# Patient Record
Sex: FEMALE | Race: WHITE | Employment: OTHER | URBAN - NONMETROPOLITAN AREA
[De-identification: names, ages, dates, MRNs, and addresses within clinical notes are randomized per-mention and may not be internally consistent; named-entity substitution may affect disease eponyms.]

---

## 2017-02-07 ENCOUNTER — OFFICE VISIT (OUTPATIENT)
Dept: CARDIOLOGY CLINIC | Age: 82
End: 2017-02-07

## 2017-02-07 VITALS
SYSTOLIC BLOOD PRESSURE: 128 MMHG | DIASTOLIC BLOOD PRESSURE: 74 MMHG | HEART RATE: 68 BPM | HEIGHT: 67 IN | BODY MASS INDEX: 30.29 KG/M2 | RESPIRATION RATE: 14 BRPM | WEIGHT: 193 LBS

## 2017-02-07 DIAGNOSIS — E78.2 MIXED HYPERLIPIDEMIA: ICD-10-CM

## 2017-02-07 DIAGNOSIS — I49.3 VENTRICULAR PREMATURE DEPOLARIZATION: ICD-10-CM

## 2017-02-07 DIAGNOSIS — R00.2 PALPITATIONS: ICD-10-CM

## 2017-02-07 DIAGNOSIS — R07.9 CHEST PAIN, UNSPECIFIED TYPE: Primary | ICD-10-CM

## 2017-02-07 PROCEDURE — G8419 CALC BMI OUT NRM PARAM NOF/U: HCPCS | Performed by: INTERNAL MEDICINE

## 2017-02-07 PROCEDURE — 99214 OFFICE O/P EST MOD 30 MIN: CPT | Performed by: INTERNAL MEDICINE

## 2017-02-07 PROCEDURE — G8400 PT W/DXA NO RESULTS DOC: HCPCS | Performed by: INTERNAL MEDICINE

## 2017-02-07 PROCEDURE — 93000 ELECTROCARDIOGRAM COMPLETE: CPT | Performed by: INTERNAL MEDICINE

## 2017-02-07 PROCEDURE — 1090F PRES/ABSN URINE INCON ASSESS: CPT | Performed by: INTERNAL MEDICINE

## 2017-02-07 PROCEDURE — 4040F PNEUMOC VAC/ADMIN/RCVD: CPT | Performed by: INTERNAL MEDICINE

## 2017-02-07 PROCEDURE — G8484 FLU IMMUNIZE NO ADMIN: HCPCS | Performed by: INTERNAL MEDICINE

## 2017-02-07 PROCEDURE — 1036F TOBACCO NON-USER: CPT | Performed by: INTERNAL MEDICINE

## 2017-02-07 PROCEDURE — G8427 DOCREV CUR MEDS BY ELIG CLIN: HCPCS | Performed by: INTERNAL MEDICINE

## 2017-02-07 PROCEDURE — 1123F ACP DISCUSS/DSCN MKR DOCD: CPT | Performed by: INTERNAL MEDICINE

## 2017-02-07 RX ORDER — OXYBUTYNIN CHLORIDE 5 MG/1
5 TABLET ORAL DAILY
COMMUNITY
End: 2019-05-04

## 2017-02-07 RX ORDER — ZINC OXIDE 13 %
CREAM (GRAM) TOPICAL
COMMUNITY

## 2017-02-07 RX ORDER — ALBUTEROL SULFATE 90 UG/1
2 AEROSOL, METERED RESPIRATORY (INHALATION) PRN
COMMUNITY

## 2017-02-07 RX ORDER — ACETAMINOPHEN 500 MG
1000 TABLET ORAL PRN
COMMUNITY

## 2017-02-07 RX ORDER — BENZONATATE 100 MG/1
100 CAPSULE ORAL PRN
COMMUNITY

## 2017-02-14 ENCOUNTER — PROCEDURE VISIT (OUTPATIENT)
Dept: CARDIOLOGY CLINIC | Age: 82
End: 2017-02-14

## 2017-02-14 DIAGNOSIS — R07.9 CHEST PAIN, UNSPECIFIED TYPE: ICD-10-CM

## 2017-02-14 PROCEDURE — 78452 HT MUSCLE IMAGE SPECT MULT: CPT | Performed by: INTERNAL MEDICINE

## 2017-02-14 PROCEDURE — 93015 CV STRESS TEST SUPVJ I&R: CPT | Performed by: INTERNAL MEDICINE

## 2017-02-14 PROCEDURE — A9500 TC99M SESTAMIBI: HCPCS | Performed by: INTERNAL MEDICINE

## 2017-02-16 ENCOUNTER — TELEPHONE (OUTPATIENT)
Dept: CARDIOLOGY CLINIC | Age: 82
End: 2017-02-16

## 2017-03-20 ENCOUNTER — HOSPITAL ENCOUNTER (OUTPATIENT)
Dept: PHYSICAL THERAPY | Age: 82
Discharge: HOME | End: 2017-03-31
Attending: PHYSICAL MEDICINE & REHABILITATION | Admitting: PHYSICAL MEDICINE & REHABILITATION

## 2017-04-01 ENCOUNTER — HOSPITAL ENCOUNTER (OUTPATIENT)
Dept: PHYSICAL THERAPY | Age: 82
Discharge: HOME | End: 2017-04-30
Attending: PHYSICAL MEDICINE & REHABILITATION | Admitting: PHYSICAL MEDICINE & REHABILITATION

## 2017-05-01 ENCOUNTER — HOSPITAL ENCOUNTER (OUTPATIENT)
Dept: PHYSICAL THERAPY | Age: 82
Discharge: HOME | End: 2017-05-31
Attending: PHYSICAL MEDICINE & REHABILITATION | Admitting: PHYSICAL MEDICINE & REHABILITATION

## 2017-05-15 ENCOUNTER — HOSPITAL ENCOUNTER (OUTPATIENT)
Dept: OTHER | Age: 82
Discharge: HOME | End: 2017-05-15
Attending: SPECIALIST | Admitting: SPECIALIST

## 2017-05-17 LAB
CULTURE: NORMAL
REPORT STATUS: NORMAL
REQUEST PROBLEM: NORMAL
SPECIMEN: NORMAL

## 2017-06-01 ENCOUNTER — HOSPITAL ENCOUNTER (OUTPATIENT)
Dept: PHYSICAL THERAPY | Age: 82
Discharge: HOME | End: 2017-06-30
Attending: PHYSICAL MEDICINE & REHABILITATION | Admitting: PHYSICAL MEDICINE & REHABILITATION

## 2017-07-01 ENCOUNTER — HOSPITAL ENCOUNTER (OUTPATIENT)
Dept: PHYSICAL THERAPY | Age: 82
Discharge: HOME | End: 2017-07-24
Attending: PHYSICAL MEDICINE & REHABILITATION | Admitting: PHYSICAL MEDICINE & REHABILITATION

## 2017-08-04 ENCOUNTER — HOSPITAL ENCOUNTER (OUTPATIENT)
Dept: PHYSICAL THERAPY | Age: 82
Discharge: HOME | End: 2017-08-31
Attending: PHYSICAL MEDICINE & REHABILITATION | Admitting: PHYSICAL MEDICINE & REHABILITATION

## 2017-08-04 ASSESSMENT — PAIN SCALES - GENERAL: PAINLEVEL_OUTOF10: 0

## 2017-08-04 ASSESSMENT — PAIN DESCRIPTION - PAIN TYPE: TYPE: CHRONIC PAIN

## 2017-08-04 ASSESSMENT — PAIN DESCRIPTION - PROGRESSION: CLINICAL_PROGRESSION: NOT CHANGED

## 2017-08-04 ASSESSMENT — PAIN DESCRIPTION - FREQUENCY: FREQUENCY: INTERMITTENT

## 2017-08-04 ASSESSMENT — PAIN DESCRIPTION - LOCATION: LOCATION: BACK

## 2017-08-04 ASSESSMENT — PAIN DESCRIPTION - DESCRIPTORS: DESCRIPTORS: ACHING

## 2017-08-18 ENCOUNTER — HOSPITAL ENCOUNTER (OUTPATIENT)
Dept: PHYSICAL THERAPY | Age: 82
Discharge: HOME | End: 2017-08-18
Admitting: PHYSICAL MEDICINE & REHABILITATION

## 2017-09-01 ENCOUNTER — HOSPITAL ENCOUNTER (OUTPATIENT)
Dept: PHYSICAL THERAPY | Age: 82
Discharge: HOME | End: 2017-09-27
Attending: PHYSICAL MEDICINE & REHABILITATION | Admitting: PHYSICAL MEDICINE & REHABILITATION

## 2018-02-27 ENCOUNTER — OFFICE VISIT (OUTPATIENT)
Dept: CARDIOLOGY CLINIC | Age: 83
End: 2018-02-27

## 2018-02-27 VITALS
HEART RATE: 76 BPM | DIASTOLIC BLOOD PRESSURE: 70 MMHG | BODY MASS INDEX: 26.84 KG/M2 | WEIGHT: 171 LBS | RESPIRATION RATE: 16 BRPM | HEIGHT: 67 IN | SYSTOLIC BLOOD PRESSURE: 114 MMHG

## 2018-02-27 DIAGNOSIS — R00.2 PALPITATIONS: Primary | ICD-10-CM

## 2018-02-27 DIAGNOSIS — E78.2 MIXED HYPERLIPIDEMIA: ICD-10-CM

## 2018-02-27 DIAGNOSIS — Z82.49 FAMILY HISTORY OF EARLY CAD: ICD-10-CM

## 2018-02-27 DIAGNOSIS — R07.9 CHEST PAIN, UNSPECIFIED TYPE: ICD-10-CM

## 2018-02-27 PROCEDURE — G8400 PT W/DXA NO RESULTS DOC: HCPCS | Performed by: INTERNAL MEDICINE

## 2018-02-27 PROCEDURE — 1036F TOBACCO NON-USER: CPT | Performed by: INTERNAL MEDICINE

## 2018-02-27 PROCEDURE — G8484 FLU IMMUNIZE NO ADMIN: HCPCS | Performed by: INTERNAL MEDICINE

## 2018-02-27 PROCEDURE — 4040F PNEUMOC VAC/ADMIN/RCVD: CPT | Performed by: INTERNAL MEDICINE

## 2018-02-27 PROCEDURE — 99214 OFFICE O/P EST MOD 30 MIN: CPT | Performed by: INTERNAL MEDICINE

## 2018-02-27 PROCEDURE — G8417 CALC BMI ABV UP PARAM F/U: HCPCS | Performed by: INTERNAL MEDICINE

## 2018-02-27 PROCEDURE — 1123F ACP DISCUSS/DSCN MKR DOCD: CPT | Performed by: INTERNAL MEDICINE

## 2018-02-27 PROCEDURE — 1090F PRES/ABSN URINE INCON ASSESS: CPT | Performed by: INTERNAL MEDICINE

## 2018-02-27 PROCEDURE — G8427 DOCREV CUR MEDS BY ELIG CLIN: HCPCS | Performed by: INTERNAL MEDICINE

## 2018-02-27 PROCEDURE — 93000 ELECTROCARDIOGRAM COMPLETE: CPT | Performed by: INTERNAL MEDICINE

## 2018-02-27 RX ORDER — HYOSCYAMINE SULFATE 0.125 MG
125 TABLET ORAL 4 TIMES DAILY PRN
COMMUNITY
End: 2019-09-09

## 2018-02-27 RX ORDER — UBIDECARENONE 50 MG
CAPSULE ORAL
COMMUNITY

## 2018-02-27 RX ORDER — MULTIVITAMIN WITH IRON
250 TABLET ORAL DAILY
COMMUNITY
End: 2019-05-04

## 2018-02-27 RX ORDER — TRAZODONE HYDROCHLORIDE 100 MG/1
100 TABLET ORAL NIGHTLY
COMMUNITY

## 2018-02-27 NOTE — PATIENT INSTRUCTIONS
Continue current cardiovascular medications which have been reviewed and discussed individually with you. Primary prevention is the goal by aggressive risk modification, healthy and therapeutic life style changes for cardiovascular risk reduction. Various goals are discussed and questions answered. Appropriate prescriptions if needed on this visit are addressed. After visit summery is provided. Questions answered and patient verbalizes understanding. Follow up with PCP and see me as needed.

## 2018-04-25 ENCOUNTER — HOSPITAL ENCOUNTER (OUTPATIENT)
Dept: MAMMOGRAPHY | Age: 83
Discharge: HOME | End: 2018-04-25
Admitting: FAMILY MEDICINE

## 2018-04-25 DIAGNOSIS — Z78.0 ASYMPTOMATIC MENOPAUSAL STATE: ICD-10-CM

## 2018-04-25 DIAGNOSIS — Z78.0 ASYMPTOMATIC AGE-RELATED POSTMENOPAUSAL STATE: ICD-10-CM

## 2018-09-19 ENCOUNTER — HOSPITAL ENCOUNTER (OUTPATIENT)
Dept: PHYSICAL THERAPY | Age: 83
Discharge: HOME | End: 2018-09-19

## 2018-10-09 NOTE — PROGRESS NOTES
Physical Therapy  Cancellation/No-show Note  Patient Name:  Sylvia Howard  :  1935   Date:  10/9/2018  Cancelled visits to date: 1  Cancelled PT evaluation  No-shows to date: 0    For today's appointment patient:  [x]  Cancelled  []  Rescheduled appointment  []  No-show     Reason given by patient:  []  Patient ill  []  Conflicting appointment  []  No transportation    []  Conflict with work  []  No reason given  []  Other:     Comments:      Electronically signed by:  Erica Laughlin PT, 10/9/2018, 1:54 PM

## 2018-10-17 ENCOUNTER — HOSPITAL ENCOUNTER (OUTPATIENT)
Dept: PHYSICAL THERAPY | Age: 83
Setting detail: THERAPIES SERIES
Discharge: HOME | End: 2018-10-17
Payer: MEDICARE

## 2018-10-17 PROCEDURE — 97530 THERAPEUTIC ACTIVITIES: CPT

## 2018-10-17 PROCEDURE — G8978 MOBILITY CURRENT STATUS: HCPCS

## 2018-10-17 PROCEDURE — G8981 BODY POS CURRENT STATUS: HCPCS

## 2018-10-17 PROCEDURE — 97110 THERAPEUTIC EXERCISES: CPT

## 2018-10-17 PROCEDURE — G8982 BODY POS GOAL STATUS: HCPCS

## 2018-10-17 PROCEDURE — G8979 MOBILITY GOAL STATUS: HCPCS

## 2018-10-17 PROCEDURE — 97162 PT EVAL MOD COMPLEX 30 MIN: CPT

## 2018-10-17 ASSESSMENT — PAIN DESCRIPTION - ONSET: ONSET: PROGRESSIVE

## 2018-10-17 ASSESSMENT — PAIN DESCRIPTION - FREQUENCY: FREQUENCY: INTERMITTENT

## 2018-10-17 ASSESSMENT — PAIN SCALES - GENERAL: PAINLEVEL_OUTOF10: 1

## 2018-10-17 ASSESSMENT — PAIN DESCRIPTION - LOCATION: LOCATION: BACK;HIP

## 2018-10-17 ASSESSMENT — PAIN DESCRIPTION - DESCRIPTORS: DESCRIPTORS: ACHING

## 2018-10-17 ASSESSMENT — PAIN DESCRIPTION - ORIENTATION: ORIENTATION: RIGHT;LEFT;MID

## 2018-10-17 ASSESSMENT — PAIN DESCRIPTION - PAIN TYPE: TYPE: CHRONIC PAIN

## 2018-10-17 ASSESSMENT — PAIN DESCRIPTION - DIRECTION: RADIATING_TOWARDS: RARELY

## 2018-10-17 ASSESSMENT — PAIN DESCRIPTION - PROGRESSION: CLINICAL_PROGRESSION: GRADUALLY WORSENING

## 2018-10-17 NOTE — FLOWSHEET NOTE
pathology and how their condition effects them with their functional activities. Patient understood discussion and questions were answered. Patient understands their activity limitations and understands rational for treatment progression. Home Exercise Program:  HO issued, reviewed and discussed with patient. Pt agreed to comply. Modality/intervention planned:    [x] Therapeutic Exercise  [] Modalities:  [] Therapeutic Activity     [] Ultrasound  [] Elec  Stim  [] Gait Training      [] Cervical Traction [] Lumbar Traction  [] Neuromuscular Re-education    [] Cold/hotpack [] Iontophoresis   [x] Instruction in HEP      [] Vasopneumatic     [] Manual Therapy               [] Aquatic Therapy     Manual Treatments:  --    Modalities:  Declined    Communication with other providers:  POC sent 10/17/18    Education provided to patient/caregiver: ice/heat as needed. Adverse reactions to treatment: --     Equipment provided:  GTB    Assessment:    Pt is 81year old female with B hip trochanteric bursitis with chronic LBP. Pt now has difficulties completing prolonged walking, lifting, bending and sleeping. Pt demo deficits this date that include Hip strength, balance, weightbearing tolerance and flexibility restrictions contributing to pain and reduced tolerance. Testing this date indicate signs and symptoms of B trochanteric bursitis with lumbar stenosis. Pt will benefit with PT services with progression of strength/ROM, manual, modalities and gait activiites to maximize function. Pt prior to onset of current condition had min lateral hip pain with able to complete most mobility activities with min difficulties. Patient agrees with established plan of care and assisted in the development of their short term and long term goals. Patient had no adverse reaction with initial treatment and there are no barriers to learning. Demonstrates no mental or cognitive disorder.      Time In / Time Out:   7537/5987 Timed Code/Total Treatment Minutes:  25/65'      15; TE, 10' TA, 1 PT eval     Patients Report of Tolerance:    [] Patient limited by fatigue        [] Patient limited by pain   [] Patient limited by other medical complications   [] Other:     Prognosis:   [x] Good [x] Fair  [] Poor    Plan:   [] Continue per plan of care [] Alter current plan (see comments)  [x] Plan of care initiated [] Hold pending MD visit [] Discharge    Plan for Next Session:   Review HEP, open and closed chain exercises targeting hip ABD/ER and Ext to improve LE strength/tolerance. Standing balance activities as tolerated. Possible pool activities if no symptom improvement after few weeks.         Next Progress Note due:    Eval 10/17/18   Visit 10         Electronically signed by:  Darylene Hoes, PT, DPT, OCS  10/17/2018, 3:58 PM        10/17/2018 3:58 PM

## 2018-10-19 ENCOUNTER — HOSPITAL ENCOUNTER (OUTPATIENT)
Dept: PHYSICAL THERAPY | Age: 83
Setting detail: THERAPIES SERIES
Discharge: HOME | End: 2018-10-19
Payer: MEDICARE

## 2018-10-19 PROCEDURE — 97110 THERAPEUTIC EXERCISES: CPT

## 2018-10-19 PROCEDURE — 97530 THERAPEUTIC ACTIVITIES: CPT

## 2018-10-22 ENCOUNTER — HOSPITAL ENCOUNTER (OUTPATIENT)
Dept: PHYSICAL THERAPY | Age: 83
Discharge: HOME | End: 2018-10-22

## 2018-10-22 NOTE — FLOWSHEET NOTE
Physical Therapy  Cancellation/No-show Note  Patient Name:  Ovidio Olmstead  :  1935   Date:  10/22/2018  Cancelled visits to date: 1  No-shows to date: 0    For today's appointment patient:  [x]  Cancelled  []  Rescheduled appointment  []  No-show     Reason given by patient:  []  Patient ill  []  Conflicting appointment  []  No transportation    []  Conflict with work  []  No reason given  [x]  Other:     Comments:  Patient states that she has a new order for PT on her back. Did not want to come to therapy until she can be evaluated for her back since we are currently not treating her for it.      Electronically signed by:  Jyoti Monk PTA

## 2018-10-24 ENCOUNTER — HOSPITAL ENCOUNTER (OUTPATIENT)
Dept: MAMMOGRAPHY | Age: 83
Discharge: HOME | End: 2018-10-24
Payer: MEDICARE

## 2018-10-24 DIAGNOSIS — Z12.31 VISIT FOR SCREENING MAMMOGRAM: ICD-10-CM

## 2018-10-24 DIAGNOSIS — Z78.0 MENOPAUSE: ICD-10-CM

## 2018-10-24 PROCEDURE — 77067 SCR MAMMO BI INCL CAD: CPT

## 2018-10-29 ENCOUNTER — HOSPITAL ENCOUNTER (OUTPATIENT)
Dept: PHYSICAL THERAPY | Age: 83
Setting detail: THERAPIES SERIES
Discharge: HOME | End: 2018-10-29
Payer: MEDICARE

## 2018-10-29 PROCEDURE — 97530 THERAPEUTIC ACTIVITIES: CPT

## 2018-10-29 PROCEDURE — 97110 THERAPEUTIC EXERCISES: CPT

## 2018-10-29 NOTE — FLOWSHEET NOTE
Poor    Plan:   [x] Continue per plan of care [] Alter current plan (see comments)  [] Plan of care initiated [] Hold pending MD visit [] Discharge    Plan for Next Session:   Review HEP, open and closed chain exercises targeting hip ABD/ER and Ext to improve LE strength/tolerance. Standing balance activities as tolerated. Possible pool activities if no symptom improvement after few weeks.         Next Progress Note due:    Eval 10/17/18   Visit 10         Electronically signed by:  Bushra Schmitt II, PTA   10/29/2018, 1:03 PM        10/29/2018 1:03 PM

## 2019-04-22 ENCOUNTER — HOSPITAL ENCOUNTER (OUTPATIENT)
Dept: CARDIAC REHAB | Age: 84
Discharge: HOME | End: 2019-04-22
Payer: MEDICARE

## 2019-04-22 PROCEDURE — 93225 XTRNL ECG REC<48 HRS REC: CPT

## 2019-04-22 PROCEDURE — 93226 XTRNL ECG REC<48 HR SCAN A/R: CPT

## 2019-04-30 NOTE — PROCEDURES
[facility]                  [address] [facility] [address] [facility]                                 HOLTER MONITOR    PATIENT NAME: Alisa Mosley                 :        1935  MED REC NO:   [id number]                          ROOM:  ACCOUNT NO:   [de-identified]                           ADMIT DATE: 2019  PROVIDER:     Ambrocio Lopez MD    HOLTER MONITOR 48 HOURS    DATE OF STUDY:  2019    PATIENT OF:  JOVANI Brown    INDICATION:  To evaluate palpitations. INTERPRETATION:  Technically good quality tracing suggestive of  predominantly normal sinus rhythm at average rate of 69 beats per  minute. Heart rate varied between 57 and 109 beats per minute. Lowest  rate of 57 occurred at 08:15 a.m. The fastest rate of 109 beats per  minute occurred at 08:12 p.m. Rare isolated 27 PVCs and 67 PACs are  noted without any complex arrhythmias. The patient reported symptoms of  weakness and fear of fainting while walking down hospital corridor at  11:20 a.m. and reported palpitations at 07:14 p.m., 06:10 p.m., 07:10  p.m., 10:00 p.m. during normal rate and rhythm. No significant ectopy  is noted. CONCLUSION:  Normal study suggesting normal sinus rhythm with normal  heart rate variations and rare within normal limits ectopy. Symptoms  are reported during normal rate and rhythm.         Anselmo Alonso MD    D: 2019 13:25:25       T: 2019 19:48:07     [email or web address]  Job#: [id number]     Doc#: [id number]    CC:

## 2019-05-04 ENCOUNTER — APPOINTMENT (OUTPATIENT)
Dept: CT IMAGING | Age: 84
End: 2019-05-04
Payer: MEDICARE

## 2019-05-04 ENCOUNTER — HOSPITAL ENCOUNTER (EMERGENCY)
Age: 84
Discharge: HOME | End: 2019-05-04
Attending: EMERGENCY MEDICINE
Payer: MEDICARE

## 2019-05-04 VITALS
SYSTOLIC BLOOD PRESSURE: 126 MMHG | BODY MASS INDEX: 26.68 KG/M2 | TEMPERATURE: 97.8 F | HEIGHT: 67 IN | WEIGHT: 170 LBS | DIASTOLIC BLOOD PRESSURE: 73 MMHG | RESPIRATION RATE: 16 BRPM | OXYGEN SATURATION: 94 % | HEART RATE: 59 BPM

## 2019-05-04 DIAGNOSIS — S00.83XA CONTUSION OF FACE, INITIAL ENCOUNTER: ICD-10-CM
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
## 2019-05-04 DIAGNOSIS — S09.90XA CLOSED HEAD INJURY, INITIAL ENCOUNTER: ICD-10-CM

## 2019-05-04 PROCEDURE — 70486 CT MAXILLOFACIAL W/O DYE: CPT

## 2019-05-04 PROCEDURE — 99284 EMERGENCY DEPT VISIT MOD MDM: CPT

## 2019-05-04 PROCEDURE — 72125 CT NECK SPINE W/O DYE: CPT

## 2019-05-04 PROCEDURE — 70450 CT HEAD/BRAIN W/O DYE: CPT

## 2019-05-04 ASSESSMENT — PAIN DESCRIPTION - PAIN TYPE: TYPE: ACUTE PAIN

## 2019-05-04 ASSESSMENT — PAIN SCALES - GENERAL: PAINLEVEL_OUTOF10: 0

## 2019-05-04 ASSESSMENT — PAIN DESCRIPTION - LOCATION: LOCATION: HEAD

## 2019-05-04 ASSESSMENT — PAIN DESCRIPTION - ORIENTATION: ORIENTATION: RIGHT

## 2019-05-04 NOTE — ED PROVIDER NOTES
eMERGENCY dEPARTMENT eNCOUnter      CHIEF COMPLAINT:   Fall  Head and face pain    HPI: Susana Kenny is a 81 y.o. female who presents to the Emergency Department complaining of a pain to her right temple and right jaw after she lost her balance and fell yesterday. The patient states that she tripped and fell forward, striking the right frontal area of her head and face. She did not lose consciousness. She was able to get up on her own. She did not think she was hurt initially, however she has developed a throbbing pain in her right temple and right jaw. The pain is constant. It is worse if she touches the area and if she opens her mouth widely. It is better when she does not do this. she does not take blood thinners. The patient denies blurred vision, slurred speech, neck pain, neck stiffness, vomiting, numbness, tingling, or weakness. REVIEW OF SYSTEMS:  CONSTITUTIONAL:  Denies fever, chills, weight loss or weakness  EYES:  Denies photophobia or discharge  ENT:  Denies sore throat or ear pain  CARDIOVASCULAR:  Denies chest pain, palpitations or swelling  RESPIRATORY:  Denies cough or shortness of breath  GI:  Denies abdominal pain, nausea, vomiting, or diarrhea  MUSCULOSKELETAL:  Denies back pain  SKIN:  No rash  NEUROLOGIC:  Complains of headache, denies focal weakness or sensory changes  All systems negative except as marked. \"Remaining review of systems reviewed and negative. I have reviewed the nursing triage documentation and agree unless otherwise noted below. \"      PAST MEDICAL HISTORY:   Past Medical History:   Diagnosis Date    Chest pain     Cystitis     Family history of early CAD 2018    She reports mother  at age of 64 with heart attack. She was not a smoker.  H/O cardiovascular stress test 2017    Normal pattern of perfusion in all coronaries, normal LV size and function, EF 54%.     H/O complete electrocardiogram 10/14/2011    H/O Doppler ultrasound 2009 Carotid Doppler. Minimal atherosclerotic plaque in the carotid arterial bifurcation regions bilaterally. No sonographic evidence of hemodynamically significant carotid stenosis is noted. Antegrade flow is seen in the vertebral arteries bilaterally.  H/O echocardiogram 2/3/15    Normal left ventricular size and wall motion with normal systolic and abnorml diastolic function, mild TR, MR and AR, aortic sclerosis without stenosis, EF 55-60%.  H/O tilt table evaluation 4/4/14    Positive for orthostatic hypotension,negative for neurocardiogenic syncope    History of Holter monitoring 12/4/14    24 hour Holter. Sinus rhythm w/physiological HR variations and low-grade symptomatic ventricular ectopy.  Hyperlipidemia     IBS (irritable bowel syndrome)     Migraine     Obesity     Other activity(E029.9) 03/23/2010    48 Holter Monitor. Normal holter findingd with no clinically significant arrhythmias.  Palpitations     SOBOE (shortness of breath on exertion)     Stroke (HCC)     several TIA's    Syncopal episodes     Tachycardia     av modal re-entry       CURRENT MEDICATIONS:   Home medications reviewed. SURGICAL HISTORY:   Past Surgical History:   Procedure Laterality Date    ATRIAL ABLATION SURGERY  1999    BREAST BIOPSY      bilateral    CATARACT REMOVAL      bilateral    HYSTERECTOMY      repair of prolapse    JOINT REPLACEMENT      bilateral knee, bilateral hip    ROTATOR CUFF REPAIR      right       FAMILY HISTORY:   History reviewed. No pertinent family history.     SOCIAL HISTORY:   Social History     Socioeconomic History    Marital status:      Spouse name: Not on file    Number of children: Not on file    Years of education: Not on file    Highest education level: Not on file   Occupational History    Not on file   Social Needs    Financial resource strain: Not on file    Food insecurity:     Worry: Not on file     Inability: Not on file    Transportation needs: Medical: Not on file     Non-medical: Not on file   Tobacco Use    Smoking status: Never Smoker    Smokeless tobacco: Never Used   Substance and Sexual Activity    Alcohol use: No    Drug use: No    Sexual activity: Yes     Partners: Male     Comment:    Lifestyle    Physical activity:     Days per week: Not on file     Minutes per session: Not on file    Stress: Not on file   Relationships    Social connections:     Talks on phone: Not on file     Gets together: Not on file     Attends Jewish service: Not on file     Active member of club or organization: Not on file     Attends meetings of clubs or organizations: Not on file     Relationship status: Not on file    Intimate partner violence:     Fear of current or ex partner: Not on file     Emotionally abused: Not on file     Physically abused: Not on file     Forced sexual activity: Not on file   Other Topics Concern    Not on file   Social History Narrative    Not on file       ALLERGIES: Aspirin; Mom [magnesium hydroxide]; Oxycodone; Pcn [penicillins]; and Thorazine [chlorpromazine hcl]    PHYSICAL EXAM:  VITAL SIGNS:  ED Triage Vitals [05/04/19 1242]   Enc Vitals Group      /73      Pulse 59      Resp 16      Temp 97.8 °F (36.6 °C)      Temp Source Oral      SpO2 94 %      Weight 170 lb (77.1 kg)      Height 5' 7\" (1.702 m)      Head Circumference       Peak Flow       Pain Score       Pain Loc       Pain Edu? Excl. in [address]?        Constitutional:  Non-toxic appearance, Awake, Alert  HENT: Normocephalic, Atraumatic, tender to palpation of the right temple, no bruising, no swelling Bilateral external ears normal, Oropharynx moist, No oral exudates, Nose normal, tender to palpation over the area of the right TMJ, no swelling, no bruising  Eyes: PERRL, conjunctiva normal   Neck: Normal range of motion, No tenderness, Supple, No lymphadenopathy, No stridor, No meningeal signs  Cardiovascular:  Normal heart rate, Normal rhythm  Pulmonary/Chest:  Normal breath sounds, No respiratory distress, No wheezing  Abdomen: Bowel sounds normal, Soft, No tenderness, No masses, No pulsatile masses  Back:  No tenderness, No CVA tenderness  Extremities:  Normal range of motion, Intact distal pulses, No edema, No tenderness  Neurologic:  Alert & oriented x 3, Speech clear, CN 2-12 intact, Normal motor function, Sensation intact to light touch throughout, Normal deep tendon reflexes, No focal deficits  Skin:  Warm, Dry, No erythema, No rash      EKG:    None    Radiology / Procedures:  CT Cervical Spine WO Contrast (Final result)   Result time 05/04/19 14:02:00   Final result by Osmany Ascencio MD (05/04/19 14:02:00)                Impression:    No acute abnormality of the cervical spine. Narrative:    EXAMINATION:  CT OF THE CERVICAL SPINE WITHOUT CONTRAST 5/4/2019 1:27 pm    TECHNIQUE:  CT of the cervical spine was performed without the administration of  intravenous contrast. Multiplanar reformatted images are provided for review. Dose modulation, iterative reconstruction, and/or weight based adjustment of  the mA/kV was utilized to reduce the radiation dose to as low as reasonably  achievable. COMPARISON:  None. HISTORY:  ORDERING SYSTEM PROVIDED HISTORY: C-SPINE TRAUMA, NEXUS/CCR NEGATIVE, LOW RISK  TECHNOLOGIST PROVIDED HISTORY:  Additional signs and symptoms: fall yest. neck pain    FINDINGS:  BONES/ALIGNMENT: There is no evidence of an acute cervical spine fracture. There is normal alignment of the cervical spine. DEGENERATIVE CHANGES: No significant degenerative changes. SOFT TISSUES: There is no prevertebral soft tissue swelling.                    CT FACIAL BONES WO CONTRAST (Final result)   Result time 05/04/19 13:56:46   Final result by Osmany Ascencio MD (05/04/19 13:56:46)                Impression:    No acute traumatic injury of the facial bones.             Narrative:    EXAMINATION:  CT OF THE FACE WITHOUT CONTRAST  5/4/2019 1:27 pm    TECHNIQUE:  CT of the face was performed without the administration of intravenous  contrast. Multiplanar reformatted images are provided for review. Dose  modulation, iterative reconstruction, and/or weight based adjustment of the  mA/kV was utilized to reduce the radiation dose to as low as reasonably  achievable. COMPARISON:  None    HISTORY:  ORDERING SYSTEM PROVIDED HISTORY: Fall, facial trauma and pain  TECHNOLOGIST PROVIDED HISTORY:  Additional signs and symptoms: lt cheek abrasion, fall yest.    FINDINGS:  FACIAL BONES:  The maxilla, pterygoid plates and zygomatic arches are intact. The mandible is intact.  The mandibular condyles are normally situated.  The  nasal bones and maxillary nasal processes are intact. ORBITS:  The globes appear intact.  The extraocular muscles, optic nerve  sheath complexes and lacrimal glands appear unremarkable.  No retrobulbar  hematoma or mass is seen.  The orbital walls and rims are intact. SINUSES/MASTOIDS:  The paranasal sinuses and mastoid air cells are well  aerated.  No acute fracture is seen. SOFT TISSUES:  No appreciable facial soft tissue swelling is seen.                    CT Head WO Contrast (Final result)   Result time 05/04/19 13:55:49   Final result by Todd Black MD (05/04/19 13:55:49)                Impression:    No acute intracranial abnormality. Narrative:    EXAMINATION:  CT OF THE HEAD WITHOUT CONTRAST  5/4/2019 1:27 pm    TECHNIQUE:  CT of the head was performed without the administration of intravenous  contrast. Dose modulation, iterative reconstruction, and/or weight based  adjustment of the mA/kV was utilized to reduce the radiation dose to as low  as reasonably achievable.     COMPARISON:  Rubina 15, 2015    HISTORY:  ORDERING SYSTEM PROVIDED HISTORY: HEAD TRAUMA, CLOSED, MILD, GCS >= 13, NO  RISK FACTORS, NEURO EXAM NORMAL  TECHNOLOGIST PROVIDED HISTORY:  Has a \"code stroke\" or \"stroke alert\" been called? ->No  Additional signs and symptoms: forehead injury    FINDINGS:  BRAIN: Wayna González is mild age-appropriate atrophy seen throughout the brain  parenchyma.  There is periventricular white matter changes seen to be present  consistent with small vessel ischemic disease.  There is mild ex vacuo  dilatation of the ventricular system.  There is no intra-axial or extra-axial  bleed. Wayna González is no evidence for mass or mass effect.  The sinuses are clear  without disease.  There are no acute bony abnormality seen. ED COURSE & MEDICAL DECISION MAKING:  Pertinent Labs & Imaging studies reviewed. (See chart for details)  On exam, the patient is afebrile and nontoxic appearing. She is awake and alert. She is hemodynamically stable and neurologically intact. Neck is supple with no meningeal signs. CT head, cervical spine and facial bones are negative for acute abnormality. The patient declined needing anything for pain. I suspect that the patient had a mechanical fall and sustained a closed head injury and a facial contusion. I have a low suspicion for subarachnoid hemorrhage, meningitis, encephalitis,vasculitis, temporal arteritis, cerebrovascular accident, hypertensive emergency, pseudotumor cerebri, or mass effect. I feel that the patient is stable for outpatient management with follow up in 2-3 days. She is to return to the Emergency Department immediately for increased headache, confusion, neck stiffness, fevers, numbness, tingling, weakness, or for any other concerns. The patient verbalized understanding, was agreeable with plan, and the patient was discharged home in stable condition. Clinical Impression:  1. Fall, initial encounter    2. Closed head injury, initial encounter    3.  Contusion of face, initial encounter        Disposition referral (if applicable):  Sean Echols, [address] [facility]  [phone]    Schedule an appointment as soon as possible for a visit in 2 days      [facility] Emergency Department  [address] [place] [address] [phone]  Go to   If symptoms worsen      Disposition medications (if applicable):  Discharge Medication List as of 5/4/2019  2:11 PM            Comment: Please note this report has been produced using speech recognition software and may contain errors related to that system including errors in grammar, punctuation, and spelling, as well as words and phrases that may be inappropriate.  If there are any questions or concerns please feel free to contact the dictating provider for clarification.@        Sara Stacy MD  05/04/19 1591

## 2019-05-04 NOTE — ED NOTES
Discharge instructions given to pt. Instructed to follow up with her family dr and to return to ER if any problems or concerns. Pt verbalizes understanding.  Pt discharged ambulatory     Shiela Garcia RN  05/04/19 2134

## 2019-05-04 NOTE — ED NOTES
Pt to room 1 ambulatory. Pt states she fell on carpeted floor yesterday afternoon. States she had pain to the right side of her forehead and right temple. Denies LOC. States this morning she scratched the right side of her forehead and temple states it hurt. States when she tries to open her mouth it hurts in her right temple. Denies any pain any where else. Pt is alert, oriented and cooperative. Respirations even and unlabored. Pt is not on any blood thinners.       Haydee Thao RN  05/04/19 0920

## 2019-05-04 NOTE — ED NOTES
Pt informed that they are having trouble with the CT machine and they are shutting it down and restarting it. Informed her there might be a little wait. Pt verbalizes understanding. Pt denies any needs at this time.  Call light in reach     DR LEONOR TRUONG [facility], RN  05/04/19 3793

## 2019-05-09 ENCOUNTER — HOSPITAL ENCOUNTER (OUTPATIENT)
Age: 84
Discharge: HOME | End: 2019-05-09
Payer: MEDICARE

## 2019-05-09 ENCOUNTER — HOSPITAL ENCOUNTER (OUTPATIENT)
Dept: GENERAL RADIOLOGY | Age: 84
Discharge: HOME | End: 2019-05-09
Payer: MEDICARE

## 2019-05-09 DIAGNOSIS — K59.00 CONSTIPATION, UNSPECIFIED CONSTIPATION TYPE: ICD-10-CM

## 2019-05-09 PROCEDURE — 74018 RADEX ABDOMEN 1 VIEW: CPT

## 2019-05-13 ENCOUNTER — OFFICE VISIT (OUTPATIENT)
Dept: CARDIOLOGY CLINIC | Age: 84
End: 2019-05-13
Payer: MEDICARE

## 2019-05-13 VITALS
HEART RATE: 68 BPM | HEIGHT: 67 IN | RESPIRATION RATE: 16 BRPM | BODY MASS INDEX: 28.56 KG/M2 | WEIGHT: 182 LBS | DIASTOLIC BLOOD PRESSURE: 60 MMHG | SYSTOLIC BLOOD PRESSURE: 98 MMHG

## 2019-05-13 DIAGNOSIS — R07.9 CHEST PAIN, UNSPECIFIED TYPE: Primary | ICD-10-CM

## 2019-05-13 DIAGNOSIS — R00.2 PALPITATIONS: ICD-10-CM

## 2019-05-13 DIAGNOSIS — E78.2 MIXED HYPERLIPIDEMIA: ICD-10-CM

## 2019-05-13 DIAGNOSIS — I49.3 VENTRICULAR PREMATURE DEPOLARIZATION: ICD-10-CM

## 2019-05-13 DIAGNOSIS — Z82.49 FAMILY HISTORY OF EARLY CAD: ICD-10-CM

## 2019-05-13 PROCEDURE — G8427 DOCREV CUR MEDS BY ELIG CLIN: HCPCS | Performed by: INTERNAL MEDICINE

## 2019-05-13 PROCEDURE — 1123F ACP DISCUSS/DSCN MKR DOCD: CPT | Performed by: INTERNAL MEDICINE

## 2019-05-13 PROCEDURE — 99214 OFFICE O/P EST MOD 30 MIN: CPT | Performed by: INTERNAL MEDICINE

## 2019-05-13 PROCEDURE — G8419 CALC BMI OUT NRM PARAM NOF/U: HCPCS | Performed by: INTERNAL MEDICINE

## 2019-05-13 PROCEDURE — 1036F TOBACCO NON-USER: CPT | Performed by: INTERNAL MEDICINE

## 2019-05-13 PROCEDURE — G8399 PT W/DXA RESULTS DOCUMENT: HCPCS | Performed by: INTERNAL MEDICINE

## 2019-05-13 PROCEDURE — 1090F PRES/ABSN URINE INCON ASSESS: CPT | Performed by: INTERNAL MEDICINE

## 2019-05-13 PROCEDURE — 93000 ELECTROCARDIOGRAM COMPLETE: CPT | Performed by: INTERNAL MEDICINE

## 2019-05-13 PROCEDURE — 4040F PNEUMOC VAC/ADMIN/RCVD: CPT | Performed by: INTERNAL MEDICINE

## 2019-05-13 RX ORDER — ONDANSETRON 4 MG/1
4 TABLET, FILM COATED ORAL PRN
COMMUNITY

## 2019-05-13 RX ORDER — ACETAMINOPHEN 160 MG
TABLET,DISINTEGRATING ORAL
COMMUNITY

## 2019-05-13 RX ORDER — PHENOL 1.4 %
1 AEROSOL, SPRAY (ML) MUCOUS MEMBRANE DAILY
COMMUNITY

## 2019-05-13 RX ORDER — M-VIT,TX,IRON,MINS/CALC/FOLIC 27MG-0.4MG
1 TABLET ORAL DAILY
COMMUNITY

## 2019-05-13 RX ORDER — SOLIFENACIN SUCCINATE 10 MG/1
5 TABLET, FILM COATED ORAL DAILY
COMMUNITY
End: 2019-09-09

## 2019-05-13 NOTE — ASSESSMENT & PLAN NOTE
Rather atypical by description. She denied a stress test in 2017. I would the think her symptoms are most likely related to increased stress and anxiety.   We'll obtain echocardiogram.

## 2019-05-13 NOTE — PATIENT INSTRUCTIONS
Continue current cardiovascular medications which have been reviewed and discussed individually with you. Echo to assess LV function. Appropriate prescriptions if needed on this visit are addressed. After visit summery is provided. Questions answered and patient verbalizes understanding. Follow up in office in 6 months, sooner if needed.

## 2019-05-13 NOTE — ASSESSMENT & PLAN NOTE
She is probably feeling her PVCs. There appeared to be worse with increased stress. Stress management is counseled. She does not drink any caffeine.

## 2019-05-13 NOTE — PROGRESS NOTES
Admission medications    Medication Sig Start Date End Date Taking? Authorizing Provider   Cholecalciferol (VITAMIN D3) 2000 units CAPS Take by mouth   Yes Historical Provider, MD   ondansetron (ZOFRAN) 4 MG tablet Take 4 mg by mouth as needed for Nausea or Vomiting   Yes Historical Provider, MD   calcium carbonate 600 MG TABS tablet Take 1 tablet by mouth daily   Yes Historical Provider, MD   Multiple Vitamins-Minerals (THERAPEUTIC MULTIVITAMIN-MINERALS) tablet Take 1 tablet by mouth daily   Yes Historical Provider, MD   solifenacin (VESICARE) 10 MG tablet Take 5 mg by mouth daily   Yes Historical Provider, MD   traZODone (DESYREL) 100 MG tablet Take 100 mg by mouth nightly   Yes Historical Provider, MD   hyoscyamine (ANASPAZ;LEVSIN) 125 MCG tablet Take 125 mcg by mouth 4 times daily as needed for Cramping   Yes Historical Provider, MD   Coenzyme Q10 (COQ10) 50 MG CAPS Take by mouth   Yes Historical Provider, MD   Probiotic Product (PROBIOTIC DAILY) CAPS Take by mouth   Yes Historical Provider, MD   benzonatate (TESSALON) 100 MG capsule Take 100 mg by mouth as needed for Cough   Yes Historical Provider, MD   acetaminophen (TYLENOL) 500 MG tablet Take 1,000 mg by mouth as needed for Pain   Yes Historical Provider, MD   albuterol sulfate  (90 BASE) MCG/ACT inhaler Inhale 2 puffs into the lungs as needed for Wheezing   Yes Historical Provider, MD   midodrine (PROAMATINE) 2.5 MG tablet Take 1 tablet by mouth 3 times daily (with meals) 10/21/16  Yes Rafael Ferrell MD   fluvoxaMINE (LUVOX) 50 MG tablet Take 50 mg by mouth nightly Pt takes 1/2 in am and 1 at night   Yes Historical Provider, MD   indomethacin (INDOCIN) 25 MG capsule Take 25 mg by mouth 2 times daily (with meals) States takes suppositories for migraines   Yes Historical Provider, MD   metoprolol (LOPRESSOR) 50 MG tablet 0.5 tablets 2 times daily.  1/16/15  Yes Rafael Ferrell MD   temazepam (RESTORIL) 15 MG capsule Take 15 mg by mouth nightly as needed for Sleep. Yes Historical Provider, MD   ascorbic acid (VITAMIN C) 1000 MG tablet Take 1,000 mg by mouth daily. Yes Historical Provider, MD   atorvastatin (LIPITOR) 40 MG tablet Take 40 mg by mouth daily. Yes Historical Provider, MD   loratadine (CLARITIN) 10 MG tablet Take 10 mg by mouth as needed. Yes Historical Provider, MD   ALPRAZolam (XANAX) 0.5 MG tablet 1 mg 2 times daily    Yes Historical Provider, MD   Dicyclomine HCl (BENTYL PO) Take 10 mg by mouth as needed    Yes Historical Provider, MD   VITAMIN E 800 Units by Does not apply route daily. Yes Historical Provider, MD     Vitals:    05/13/19 1350   BP: 98/60   Site: Left Upper Arm   Position: Sitting   Cuff Size: Large Adult   Pulse: 68   Resp: 16   Weight: 182 lb (82.6 kg)   Height: 5' 7\" (1.702 m)      Body mass index is 28.51 kg/m². Wt Readings from Last 3 Encounters:   05/13/19 182 lb (82.6 kg)   05/04/19 170 lb (77.1 kg)   04/09/18 170 lb (77.1 kg)     Constitutional:  Pleasant female in no apparent distress. She gained weight since last visit. Eyes:  Pupils are equal.  She wears glasses. NECK: No JVP or thyromegaly  Cardiovascular: Auscultation: Normal S1 and S2. No murmurs or gallops noted. .  Carotids are negative for bruits. Peripheral pulses: 1+ pedal pulses equal in both feet. Respiratory:  Respiratory effort is normal.  Breath sounds are clear to auscultation. Extremities:  No edema, clubbing,  Cyanosis, petechiae. SKIN: Warm and well perfused, no pallor or cyanosis  Abdomen:  No masses or tenderness. No organomegaly noted. Musculoskeletal:  No obvious spinal deformities noted. Gait is normal  Muscle strength is normal.  Neurologic:  Oriented to time, place, and person and non-anxious. No focal neurological deficit noted. Psychiatric: Normal mood and effect.      ECG today is consistent with sinus rhythm with PVCs rate is 66 bpm.    Holter on 4/22/2019 reported   Normal study suggesting normal sinus rhythm with normal  heart rate variations and rare within normal limits ectopy. Symptoms  are reported during normal rate and rhythm.     LAB REVIEW:    CBC:   Lab Results   Component Value Date    WBC 6.5 09/29/2016    HGB 15.3 09/29/2016    HCT 45.7 09/29/2016     09/29/2016     Lipids: No results found for: CHOL, TRIG, HDL, LDLCALC, LDLDIRECT  Renal:   Lab Results   Component Value Date    BUN 22 09/29/2016    CREATININE 1.0 09/29/2016     09/29/2016    K 3.9 09/29/2016     PT/INR:   Lab Results   Component Value Date    INR 0.89 06/15/2015       IMPRESSION and RECOMMENDATIONS:      Palpitations  She is probably feeling her PVCs. There appeared to be worse with increased stress. Stress management is counseled. She does not drink any caffeine. Hyperlipidemia  Obtain lipid results from Dr. Winston Espinal office. Family history of early CAD  Continue aggressive risk modification for primary prevention. Chest pain  Rather atypical by description. She denied a stress test in 2017. I would the think her symptoms are most likely related to increased stress and anxiety. We'll obtain echocardiogram.    Arrhythmia  Continue Lopressor 25 twice a day. Continue current cardiovascular medications which have been reviewed and discussed individually with you. Echo to assess LV function. Appropriate prescriptions if needed on this visit are addressed. After visit summery is provided. Questions answered and patient verbalizes understanding. Follow up in office in 6 months, sooner if needed. Elie Moser MD, 5/13/2019 2:23 PM     Please note this report has been partially produced using speech recognition software and may contain errors related to that system including errors in grammar, punctuation, and spelling, as well as words and phrases that may be inappropriate. If there are any questions or concerns please feel free to contact the dictating provider for clarification.

## 2019-05-21 ENCOUNTER — TELEPHONE (OUTPATIENT)
Dept: CARDIOLOGY CLINIC | Age: 84
End: 2019-05-21

## 2019-05-21 NOTE — TELEPHONE ENCOUNTER
The patient is wondering why her echo was moved a week out and she wasn't notified about the changed .  The patient would like a call back about this

## 2019-05-21 NOTE — TELEPHONE ENCOUNTER
I tried to call the patient to let her know that according to what was charted appointment was changed by the patient.

## 2019-05-29 ENCOUNTER — PROCEDURE VISIT (OUTPATIENT)
Dept: CARDIOLOGY CLINIC | Age: 84
End: 2019-05-29
Payer: MEDICARE

## 2019-05-29 DIAGNOSIS — R07.9 CHEST PAIN, UNSPECIFIED TYPE: ICD-10-CM

## 2019-05-29 DIAGNOSIS — R00.2 PALPITATIONS: Primary | ICD-10-CM

## 2019-05-29 LAB
LV EF: 58 %
LVEF MODALITY: NORMAL

## 2019-05-29 PROCEDURE — 93306 TTE W/DOPPLER COMPLETE: CPT | Performed by: INTERNAL MEDICINE

## 2019-05-31 ENCOUNTER — TELEPHONE (OUTPATIENT)
Dept: CARDIOLOGY CLINIC | Age: 84
End: 2019-05-31

## 2019-09-03 ENCOUNTER — TELEPHONE (OUTPATIENT)
Dept: CARDIOLOGY CLINIC | Age: 84
End: 2019-09-03

## 2019-09-03 NOTE — TELEPHONE ENCOUNTER
Called back and spoke w/patient. She states she is noticing palpitations more frequently than before and feels weal off and on; she would like to be seen sooner than next scheduled OV in November.   Scheduled OV for 9/9/19 @ 4:20 pm.

## 2019-09-09 ENCOUNTER — OFFICE VISIT (OUTPATIENT)
Dept: CARDIOLOGY CLINIC | Age: 84
End: 2019-09-09
Payer: MEDICARE

## 2019-09-09 VITALS
SYSTOLIC BLOOD PRESSURE: 130 MMHG | BODY MASS INDEX: 28.56 KG/M2 | DIASTOLIC BLOOD PRESSURE: 80 MMHG | HEART RATE: 70 BPM | WEIGHT: 182 LBS | RESPIRATION RATE: 16 BRPM | HEIGHT: 67 IN

## 2019-09-09 DIAGNOSIS — E78.2 MIXED HYPERLIPIDEMIA: ICD-10-CM

## 2019-09-09 DIAGNOSIS — R07.9 CHEST PAIN, UNSPECIFIED TYPE: ICD-10-CM

## 2019-09-09 DIAGNOSIS — R00.2 PALPITATIONS: Primary | ICD-10-CM

## 2019-09-09 DIAGNOSIS — G47.33 OSA (OBSTRUCTIVE SLEEP APNEA): ICD-10-CM

## 2019-09-09 PROCEDURE — 99214 OFFICE O/P EST MOD 30 MIN: CPT | Performed by: INTERNAL MEDICINE

## 2019-09-09 PROCEDURE — G8399 PT W/DXA RESULTS DOCUMENT: HCPCS | Performed by: INTERNAL MEDICINE

## 2019-09-09 PROCEDURE — 1123F ACP DISCUSS/DSCN MKR DOCD: CPT | Performed by: INTERNAL MEDICINE

## 2019-09-09 PROCEDURE — G8427 DOCREV CUR MEDS BY ELIG CLIN: HCPCS | Performed by: INTERNAL MEDICINE

## 2019-09-09 PROCEDURE — 93000 ELECTROCARDIOGRAM COMPLETE: CPT | Performed by: INTERNAL MEDICINE

## 2019-09-09 PROCEDURE — 4040F PNEUMOC VAC/ADMIN/RCVD: CPT | Performed by: INTERNAL MEDICINE

## 2019-09-09 PROCEDURE — 1036F TOBACCO NON-USER: CPT | Performed by: INTERNAL MEDICINE

## 2019-09-09 PROCEDURE — G8419 CALC BMI OUT NRM PARAM NOF/U: HCPCS | Performed by: INTERNAL MEDICINE

## 2019-09-09 PROCEDURE — 1090F PRES/ABSN URINE INCON ASSESS: CPT | Performed by: INTERNAL MEDICINE

## 2019-09-09 NOTE — PROGRESS NOTES
Take 15 mg by mouth nightly as needed for Sleep. Yes Historical Provider, MD   ascorbic acid (VITAMIN C) 1000 MG tablet Take 1,000 mg by mouth daily. Yes Historical Provider, MD   atorvastatin (LIPITOR) 40 MG tablet Take 40 mg by mouth daily. Yes Historical Provider, MD   ALPRAZolam (XANAX) 0.5 MG tablet 1 mg 2 times daily    Yes Historical Provider, MD   Dicyclomine HCl (BENTYL PO) Take 10 mg by mouth as needed    Yes Historical Provider, MD   VITAMIN E 800 Units by Does not apply route daily. Yes Historical Provider, MD     Vitals:    09/09/19 1633   BP: 130/80   Site: Left Upper Arm   Position: Sitting   Cuff Size: Medium Adult   Pulse: 70   Resp: 16   Weight: 182 lb (82.6 kg)   Height: 5' 7\" (1.702 m)      Body mass index is 28.51 kg/m². Wt Readings from Last 3 Encounters:   09/09/19 182 lb (82.6 kg)   05/13/19 182 lb (82.6 kg)   05/04/19 170 lb (77.1 kg)     Constitutional:  Pleasant female in no apparent distress. Honora Ely His remain unchanged. Eyes:  Pupils are equal.  She wears glasses. NECK: No JVP or thyromegaly  Cardiovascular: Auscultation: Normal S1 and S2. No murmurs or gallops noted. .  Carotids are negative for bruits. Peripheral pulses: 1+ pedal pulses equal in both feet. Respiratory:  Respiratory effort is normal.  Breath sounds are clear to auscultation. Extremities:  No edema, clubbing,  Cyanosis, petechiae. SKIN: Warm and well perfused, no pallor or cyanosis  Abdomen:  No masses or tenderness. No organomegaly noted. Musculoskeletal:  No obvious spinal deformities noted. Gait is normal  Muscle strength is normal.  Neurologic:  Oriented to time, place, and person and non-anxious. No focal neurological deficit noted. Psychiatric: Normal mood and effect. EKG today showed sinus rhythm 70 bpm.     Holter monitor done in April 2019 reported   normal study suggesting normal sinus rhythm with normal  heart rate variations and rare within normal limits ectopy.   Symptoms  are reported during normal rate and rhythm. May 23, 2019 echocardiogram reported  LV function and size are normal, Ejection Fraction 55-60 %. Normal left ventricular wall thickness. No regional wall motion abnormalities were detected. Diastolic Dysfunction Grade I . All chamber dimensions are within normal limits. Sclerotic, but non-stenotic aortic valve. Mild aortic regurgitation is noted with a pressure half time of 638 msec. Thickening of mitral valve leaflets is noted. Mild mitral, tricuspid , and pulmonic insufficiency. Right ventricular systolic pressure of 38 mm Hg consistent with mild   pulmonary hypertension. No evidence of pericardial effusion. LAB REVIEW:    CBC:   Lab Results   Component Value Date    WBC 6.5 09/29/2016    HGB 15.3 09/29/2016    HCT 45.7 09/29/2016     09/29/2016     Renal:   Lab Results   Component Value Date    BUN 22 09/29/2016    CREATININE 1.0 09/29/2016     09/29/2016    K 3.9 09/29/2016     PT/INR:   Lab Results   Component Value Date    INR 0.89 06/15/2015       IMPRESSION and RECOMMENDATIONS:      KYLEIGH (obstructive sleep apnea)  Found weakness and daytime sleepiness could be secondary to significant obstructive sleep apnea. I have referred her for further evaluation. Meanwhile we will hold off on her Lopressor therapy. Hyperlipidemia  Need to get the results of most recent lipid analysis by PCP. Continue current statin therapy. Palpitations  Has infrequent PACs and PVCs I doubt her symptoms are related to ectopy. Most likely related to her anxiety. Sleep hygiene is counseled. Hold Metoprolol for now. Continue other current medications and consult sleep specialist for furthe evaluation of excessive fatigue and day time sleepiness. Appropriate prescriptions if needed on this visit are addressed. After visit summery is provided. Questions answered and patient verbalizes understanding. Follow up in office in 3 months, sooner if needed. Please bring all medication bottles and most recent labs to each office visit      Ryan Ramos MD, 9/9/2019 5:12 PM     Please note this report has been partially produced using speech recognition software and may contain errors related to that system including errors in grammar, punctuation, and spelling, as well as words and phrases that may be inappropriate. If there are any questions or concerns please feel free to contact the dictating provider for clarification.

## 2019-09-09 NOTE — PATIENT INSTRUCTIONS
Hold Metoprolol for now. Continue other current medications and consult sleep specialist for furthe evaluation of excessive fatigue and day time sleepiness. Appropriate prescriptions if needed on this visit are addressed. After visit summery is provided. Questions answered and patient verbalizes understanding. Follow up in office in 3 months, sooner if needed.   Please bring all medication bottles and most recent labs to each office visit

## 2019-10-28 ENCOUNTER — INITIAL CONSULT (OUTPATIENT)
Dept: PULMONOLOGY | Age: 84
End: 2019-10-28
Payer: MEDICARE

## 2019-10-28 VITALS
OXYGEN SATURATION: 95 % | DIASTOLIC BLOOD PRESSURE: 100 MMHG | HEART RATE: 100 BPM | SYSTOLIC BLOOD PRESSURE: 160 MMHG | WEIGHT: 180 LBS | BODY MASS INDEX: 28.25 KG/M2 | HEIGHT: 67 IN

## 2019-10-28 DIAGNOSIS — G47.33 OSA (OBSTRUCTIVE SLEEP APNEA): ICD-10-CM

## 2019-10-28 DIAGNOSIS — R53.83 OTHER FATIGUE: Primary | ICD-10-CM

## 2019-10-28 PROCEDURE — 1090F PRES/ABSN URINE INCON ASSESS: CPT | Performed by: NURSE PRACTITIONER

## 2019-10-28 PROCEDURE — 99203 OFFICE O/P NEW LOW 30 MIN: CPT | Performed by: NURSE PRACTITIONER

## 2019-10-28 PROCEDURE — G8417 CALC BMI ABV UP PARAM F/U: HCPCS | Performed by: NURSE PRACTITIONER

## 2019-10-28 PROCEDURE — G8427 DOCREV CUR MEDS BY ELIG CLIN: HCPCS | Performed by: NURSE PRACTITIONER

## 2019-10-28 PROCEDURE — G8484 FLU IMMUNIZE NO ADMIN: HCPCS | Performed by: NURSE PRACTITIONER

## 2019-10-28 ASSESSMENT — SLEEP AND FATIGUE QUESTIONNAIRES
HOW LIKELY ARE YOU TO NOD OFF OR FALL ASLEEP IN A CAR, WHILE STOPPED FOR A FEW MINUTES IN TRAFFIC: 0
HOW LIKELY ARE YOU TO NOD OFF OR FALL ASLEEP WHILE SITTING QUIETLY AFTER LUNCH WITHOUT ALCOHOL: 3
NECK CIRCUMFERENCE (INCHES): 15.5
HOW LIKELY ARE YOU TO NOD OFF OR FALL ASLEEP WHILE SITTING AND READING: 2
HOW LIKELY ARE YOU TO NOD OFF OR FALL ASLEEP WHILE SITTING INACTIVE IN A PUBLIC PLACE: 0
HOW LIKELY ARE YOU TO NOD OFF OR FALL ASLEEP WHILE SITTING AND TALKING TO SOMEONE: 0
HOW LIKELY ARE YOU TO NOD OFF OR FALL ASLEEP WHILE WATCHING TV: 0
ESS TOTAL SCORE: 6
HOW LIKELY ARE YOU TO NOD OFF OR FALL ASLEEP WHEN YOU ARE A PASSENGER IN A CAR FOR AN HOUR WITHOUT A BREAK: 1
HOW LIKELY ARE YOU TO NOD OFF OR FALL ASLEEP WHILE LYING DOWN TO REST IN THE AFTERNOON WHEN CIRCUMSTANCES PERMIT: 0

## 2019-11-04 ENCOUNTER — HOSPITAL ENCOUNTER (OUTPATIENT)
Dept: SLEEP CENTER | Age: 84
Discharge: HOME | End: 2019-11-04
Payer: MEDICARE

## 2019-11-04 DIAGNOSIS — R53.83 OTHER FATIGUE: ICD-10-CM

## 2019-11-04 DIAGNOSIS — G47.33 OSA (OBSTRUCTIVE SLEEP APNEA): ICD-10-CM

## 2019-11-04 PROCEDURE — 95810 POLYSOM 6/> YRS 4/> PARAM: CPT

## 2019-11-04 ASSESSMENT — SLEEP AND FATIGUE QUESTIONNAIRES
HOW LIKELY ARE YOU TO NOD OFF OR FALL ASLEEP WHILE SITTING AND TALKING TO SOMEONE: 0
HOW LIKELY ARE YOU TO NOD OFF OR FALL ASLEEP WHILE WATCHING TV: 0
HOW LIKELY ARE YOU TO NOD OFF OR FALL ASLEEP WHILE SITTING INACTIVE IN A PUBLIC PLACE: 0
ESS TOTAL SCORE: 2
HOW LIKELY ARE YOU TO NOD OFF OR FALL ASLEEP WHILE SITTING QUIETLY AFTER LUNCH WITHOUT ALCOHOL: 0
HOW LIKELY ARE YOU TO NOD OFF OR FALL ASLEEP WHILE SITTING AND READING: 2
NECK CIRCUMFERENCE (INCHES): 15
HOW LIKELY ARE YOU TO NOD OFF OR FALL ASLEEP IN A CAR, WHILE STOPPED FOR A FEW MINUTES IN TRAFFIC: 0
HOW LIKELY ARE YOU TO NOD OFF OR FALL ASLEEP WHILE LYING DOWN TO REST IN THE AFTERNOON WHEN CIRCUMSTANCES PERMIT: 0
HOW LIKELY ARE YOU TO NOD OFF OR FALL ASLEEP WHEN YOU ARE A PASSENGER IN A CAR FOR AN HOUR WITHOUT A BREAK: 0

## 2019-11-07 LAB — STATUS: NORMAL

## 2019-11-07 PROCEDURE — 95810 POLYSOM 6/> YRS 4/> PARAM: CPT | Performed by: INTERNAL MEDICINE

## 2019-11-19 ENCOUNTER — TELEPHONE (OUTPATIENT)
Dept: PULMONOLOGY | Age: 84
End: 2019-11-19

## 2019-12-09 ENCOUNTER — TELEPHONE (OUTPATIENT)
Dept: PULMONOLOGY | Age: 84
End: 2019-12-09

## 2019-12-16 ENCOUNTER — OFFICE VISIT (OUTPATIENT)
Dept: PULMONOLOGY | Age: 84
End: 2019-12-16
Payer: MEDICARE

## 2019-12-16 DIAGNOSIS — R53.83 OTHER FATIGUE: Primary | ICD-10-CM

## 2019-12-16 PROCEDURE — G8417 CALC BMI ABV UP PARAM F/U: HCPCS | Performed by: NURSE PRACTITIONER

## 2019-12-16 PROCEDURE — 1123F ACP DISCUSS/DSCN MKR DOCD: CPT | Performed by: NURSE PRACTITIONER

## 2019-12-16 PROCEDURE — G8484 FLU IMMUNIZE NO ADMIN: HCPCS | Performed by: NURSE PRACTITIONER

## 2019-12-16 PROCEDURE — G8428 CUR MEDS NOT DOCUMENT: HCPCS | Performed by: NURSE PRACTITIONER

## 2019-12-16 PROCEDURE — 1090F PRES/ABSN URINE INCON ASSESS: CPT | Performed by: NURSE PRACTITIONER

## 2019-12-16 PROCEDURE — 4040F PNEUMOC VAC/ADMIN/RCVD: CPT | Performed by: NURSE PRACTITIONER

## 2019-12-16 PROCEDURE — G8399 PT W/DXA RESULTS DOCUMENT: HCPCS | Performed by: NURSE PRACTITIONER

## 2019-12-16 PROCEDURE — 1036F TOBACCO NON-USER: CPT | Performed by: NURSE PRACTITIONER

## 2019-12-16 PROCEDURE — 99213 OFFICE O/P EST LOW 20 MIN: CPT | Performed by: NURSE PRACTITIONER

## 2020-04-30 ENCOUNTER — APPOINTMENT (OUTPATIENT)
Dept: GENERAL RADIOLOGY | Age: 85
End: 2020-04-30
Payer: COMMERCIAL

## 2020-04-30 ENCOUNTER — HOSPITAL ENCOUNTER (EMERGENCY)
Age: 85
Discharge: HOME | End: 2020-04-30
Attending: EMERGENCY MEDICINE
Payer: COMMERCIAL

## 2020-04-30 ENCOUNTER — APPOINTMENT (OUTPATIENT)
Dept: CT IMAGING | Age: 85
End: 2020-04-30
Payer: COMMERCIAL

## 2020-04-30 VITALS
TEMPERATURE: 98.1 F | HEART RATE: 77 BPM | WEIGHT: 179.9 LBS | DIASTOLIC BLOOD PRESSURE: 84 MMHG | HEIGHT: 67 IN | BODY MASS INDEX: 28.24 KG/M2 | OXYGEN SATURATION: 95 % | RESPIRATION RATE: 13 BRPM | SYSTOLIC BLOOD PRESSURE: 141 MMHG

## 2020-04-30 LAB
ALBUMIN SERPL-MCNC: 4.5 GM/DL (ref 3.4–5)
ALP BLD-CCNC: 108 IU/L (ref 40–129)
ALT SERPL-CCNC: 18 U/L (ref 10–40)
ANION GAP SERPL CALCULATED.3IONS-SCNC: 16 MMOL/L (ref 4–16)
AST SERPL-CCNC: 20 IU/L (ref 15–37)
BASOPHILS ABSOLUTE: 0.1 K/CU MM
BASOPHILS RELATIVE PERCENT: 0.8 % (ref 0–1)
BILIRUB SERPL-MCNC: 0.8 MG/DL (ref 0–1)
BUN BLDV-MCNC: 18 MG/DL (ref 6–23)
CALCIUM SERPL-MCNC: 10 MG/DL (ref 8.3–10.6)
CHLORIDE BLD-SCNC: 100 MMOL/L (ref 99–110)
CHP ED QC CHECK: 105
CHP ED QC CHECK: YES
CO2: 25 MMOL/L (ref 21–32)
CREAT SERPL-MCNC: 1.4 MG/DL (ref 0.6–1.1)
DIFFERENTIAL TYPE: ABNORMAL
EOSINOPHILS ABSOLUTE: 0.1 K/CU MM
EOSINOPHILS RELATIVE PERCENT: 1.3 % (ref 0–3)
GFR AFRICAN AMERICAN: 43 ML/MIN/1.73M2
GFR NON-AFRICAN AMERICAN: 36 ML/MIN/1.73M2
GLUCOSE BLD-MCNC: 105 MG/DL (ref 70–99)
GLUCOSE BLD-MCNC: 109 MG/DL (ref 70–99)
HCT VFR BLD CALC: 45.7 % (ref 37–47)
HEMOGLOBIN: 14.9 GM/DL (ref 12.5–16)
IMMATURE NEUTROPHIL %: 0.5 % (ref 0–0.43)
INR BLD: 0.95 INDEX
LYMPHOCYTES ABSOLUTE: 1.9 K/CU MM
LYMPHOCYTES RELATIVE PERCENT: 30.9 % (ref 24–44)
MCH RBC QN AUTO: 30.1 PG (ref 27–31)
MCHC RBC AUTO-ENTMCNC: 32.6 % (ref 32–36)
MCV RBC AUTO: 92.3 FL (ref 78–100)
MONOCYTES ABSOLUTE: 0.6 K/CU MM
MONOCYTES RELATIVE PERCENT: 9.6 % (ref 0–4)
PDW BLD-RTO: 13.2 % (ref 11.7–14.9)
PLATELET # BLD: 186 K/CU MM (ref 140–440)
PMV BLD AUTO: 10 FL (ref 7.5–11.1)
POTASSIUM SERPL-SCNC: 3.7 MMOL/L (ref 3.5–5.1)
PRO-BNP: 193.8 PG/ML
PROTHROMBIN TIME: 10.8 SECONDS (ref 11.7–14.5)
RBC # BLD: 4.95 M/CU MM (ref 4.2–5.4)
SEGMENTED NEUTROPHILS ABSOLUTE COUNT: 3.6 K/CU MM
SEGMENTED NEUTROPHILS RELATIVE PERCENT: 56.9 % (ref 36–66)
SODIUM BLD-SCNC: 141 MMOL/L (ref 135–145)
TOTAL IMMATURE NEUTOROPHIL: 0.03 K/CU MM
TOTAL PROTEIN: 7.2 GM/DL (ref 6.4–8.2)
TROPONIN T: <0.01 NG/ML
WBC # BLD: 6.3 K/CU MM (ref 4–10.5)

## 2020-04-30 PROCEDURE — 99284 EMERGENCY DEPT VISIT MOD MDM: CPT

## 2020-04-30 PROCEDURE — 85610 PROTHROMBIN TIME: CPT

## 2020-04-30 PROCEDURE — 80053 COMPREHEN METABOLIC PANEL: CPT

## 2020-04-30 PROCEDURE — 84484 ASSAY OF TROPONIN QUANT: CPT

## 2020-04-30 PROCEDURE — 82962 GLUCOSE BLOOD TEST: CPT

## 2020-04-30 PROCEDURE — 6370000000 HC RX 637 (ALT 250 FOR IP): Performed by: EMERGENCY MEDICINE

## 2020-04-30 PROCEDURE — 93005 ELECTROCARDIOGRAM TRACING: CPT | Performed by: EMERGENCY MEDICINE

## 2020-04-30 PROCEDURE — 70450 CT HEAD/BRAIN W/O DYE: CPT

## 2020-04-30 PROCEDURE — 83880 ASSAY OF NATRIURETIC PEPTIDE: CPT

## 2020-04-30 PROCEDURE — 71045 X-RAY EXAM CHEST 1 VIEW: CPT

## 2020-04-30 PROCEDURE — 85025 COMPLETE CBC W/AUTO DIFF WBC: CPT

## 2020-04-30 RX ORDER — TEMAZEPAM 15 MG/1
15 CAPSULE ORAL NIGHTLY PRN
Qty: 3 CAPSULE | Refills: 0 | Status: SHIPPED | OUTPATIENT
Start: 2020-04-30 | End: 2020-05-03

## 2020-04-30 RX ORDER — ALPRAZOLAM 0.5 MG/1
1 TABLET ORAL 2 TIMES DAILY
Qty: 12 TABLET | Refills: 0 | Status: SHIPPED | OUTPATIENT
Start: 2020-04-30 | End: 2020-05-03

## 2020-04-30 RX ORDER — ALPRAZOLAM 0.25 MG/1
0.5 TABLET ORAL ONCE
Status: DISCONTINUED | OUTPATIENT
Start: 2020-04-30 | End: 2020-04-30

## 2020-04-30 RX ORDER — ALPRAZOLAM 0.25 MG/1
1 TABLET ORAL ONCE
Status: COMPLETED | OUTPATIENT
Start: 2020-04-30 | End: 2020-04-30

## 2020-04-30 RX ADMIN — ALPRAZOLAM 1 MG: 0.25 TABLET ORAL at 16:57

## 2020-04-30 NOTE — ED NOTES
Pt discharged with instructions and prescriptions. Discussed when and how to take medications and pt stated understanding.   Pt walked out of the [id number], RN  04/30/20 [id number]

## 2020-04-30 NOTE — ED PROVIDER NOTES
Emergency Department Encounter    Patient: Gabo Osorio  MRN: [id number]  : 1935  Date of Evaluation: 2020  ED Provider:  Jimbo Chi    Triage Chief Complaint:   Dizziness (pt states dizziness and fells heart fluttering.)    [place]:  Gabo Osorio is a 81 y.o. female that presents with concern for palpitations, lightheadedness, shortness of breath, all of this started last night. She has not taken any of her medications today, states she does not know why she did not take her medications, feels like maybe she has been confused today. No headache or vision changes. Feels some chest pressure but no pain currently. No vomiting but has felt nauseous. No sweating. She is extremely anxious, states that her  had just had a heart attack and needs someone at home with him, began sobbing, hyperventilating, stating that her son is dead and she only has her daughter and her daughter lives in [place]. When asked if her daughter could go stay with her  she started to calm down and stated yes she thinks she could, but then she could not remember her daughter's phone number and began getting extremely worked up and crying again. She has had no leg swelling or pain. She is not on blood thinners. She does take something for anxiety but could not remember what it was. No dysuria or hematuria. No diarrhea. No abdominal pain. No neck pain. no fevers or cough. She denies trauma and falls or injury. ROS - see HPI, below listed is current ROS at time of my eval:  10 systems reviewed and negative except as above. Past Medical History:   Diagnosis Date    Chest pain     Cystitis     Family history of early CAD 2018    She reports mother  at age of 64 with heart attack. She was not a smoker.     H/O 24 hour EKG monitoring 2019    Normal    H/O cardiovascular stress test 2017    Normal pattern of perfusion in all coronaries, normal LV size and function, DAILY) CAPS Take by mouth      benzonatate (TESSALON) 100 MG capsule Take 100 mg by mouth as needed for Cough      acetaminophen (TYLENOL) 500 MG tablet Take 1,000 mg by mouth as needed for Pain      albuterol sulfate  (90 BASE) MCG/ACT inhaler Inhale 2 puffs into the lungs as needed for Wheezing      midodrine (PROAMATINE) 2.5 MG tablet Take 1 tablet by mouth 3 times daily (with meals) 270 tablet 0    fluvoxaMINE (LUVOX) 50 MG tablet Take 50 mg by mouth 2 times daily       indomethacin (INDOCIN) 25 MG capsule Take 25 mg by mouth 2 times daily (with meals) States takes suppositories for migraines      ascorbic acid (VITAMIN C) 1000 MG tablet Take 1,000 mg by mouth daily.  atorvastatin (LIPITOR) 40 MG tablet Take 40 mg by mouth daily.  Dicyclomine HCl (BENTYL PO) Take 10 mg by mouth as needed       VITAMIN E 800 Units by Does not apply route daily. Allergies   Allergen Reactions    Aspirin     Mom [Magnesium Hydroxide]     Oxycodone Itching     States it just doesn't work. Denies itching    Pcn [Penicillins]     Thorazine [Chlorpromazine Hcl]        Nursing Notes Reviewed    Physical Exam:  ED Triage Vitals [04/30/20 1610]   Enc Vitals Group      BP       Pulse 81      Resp 24      Temp 98.1 °F (36.7 °C)      Temp Source Oral      SpO2 100 %      Weight       Height       Head Circumference       Peak Flow       Pain Score       Pain Loc       Pain Edu? Excl. in [address]? My pulse ox interpretation is - normal    General appearance:  Patient hyperventilating, very anxious  Skin:  Warm. Dry. Eye:  Extraocular movements intact. Ears, nose, mouth and throat:  Oral mucosa moist   Neck:  Trachea midline. Extremity:  No swelling. Normal ROM    Heart:  Regular rate and rhythm, normal S1 & S2, no extra heart sounds. Perfusion:  intact  Respiratory: patient hyperventilating, otherwise can speak in full sentences, lungs are clear bilaterally. Abdominal:    Soft. she is on 1 mg twice a day at home. [address] [place] spoke with patient's  at home and he is fine, has already called and spoken with their son in law and their . We have reassured patient that her family is taken care of.     1640- spoke with patient's daughter Shay Chaidez on the phone-patient has long history of anxiety. She often gets these symptoms, the palpitations, dizziness and lightheadedness feeling when she stands, generally not feeling well, when she is out of her anxiety medications. She gets blister packs, they had forgotten to call for a refill and 10 days ago had run out of her benzodiazepine, the Xanax and the temazepam.  These are prescribed by her psychiatrist, Dr. Fito Miller. According to daughter the prescription had actually  by the time that they had called in. Daughter had gone to the psychiatrist office and they were supposed to have gotten it filled today but she does not know if it had happened. These are exactly the same symptoms she has had when she has been out of those medications before. I do suspect a lot of this may be related to the patient's anxiety, have already ordered the dose of Xanax, if needed we will also get her dose of the temazepam, labs pending. EKG as above. I have let the patient know that her son in law (Reva's ) is at the house with her , patient does seem to calm down when she learned this. We will continue with workup at this time. Update - CBC, INR, trop normal. Awaiting chemistry and imaging. I spoke again with patient, she is much more calm, states she feels back to normal, her blood pressure systolic is now in the 628F instead of the 200s, she is feeling much better. No longer tachypneic, heart rate is normal.  We will continue to monitor and if chemistry and imaging is reassuring plan will be for discharge home      -updated patient, her blood pressure is 139/87, respiratory rate of 17, heart rate of 73.   In no distress. Labs are all reassuring, CT head negative for any acute process and she is neurologically intact here, follows commands with all limbs, sensation intact, no facial droop or slurred speech. She is not actually having any symptoms of dizziness like room spinning, it was lightheadedness episodes. She is feeling back at her normal baseline after the Xanax. I do suspect that much of this was related to benzodiazepine withdrawal and anxiety/panic attack. Called and spoke with patient's daughter Pasquale Garibay who will come to pick the patient up. They had not yet been able to fill her temazepam and Xanax prescriptions so I have written for 3 days worth of pills, discussed with both Paqsuale Garibay and the patient that I can write for no longer than that but that should give them a good amount of time in order to get a hold of her doctor and make sure that that is filled for her, we do not want to repeat this visit and her symptoms given how distressed she was. That will give them all day tomorrow Friday, and through the weekend so that if it can be filled at least by Monday then they should be fine. Given strict return precautions, will be discharged in stable condition. Patient and family members are agreeable to this    Clinical Impression:  1. Anxiety    2.  Lightheadedness      Disposition referral (if applicable):  Florene Gilford, [facility] [address] [facility]  [phone]    Schedule an appointment as soon as possible for a visit       [facility] Emergency Department  [address] [facility]  [phone]    If symptoms worsen    Disposition medications (if applicable):  Current Discharge Medication List        ED Provider Disposition Time  DISPOSITION Discharge - Pending Orders Complete 04/30/2020 05:49:46 PM      Comment: Please note this report has been produced using speech recognition software and may contain errors related to that system including errors in grammar, punctuation, and spelling, as well as words and phrases that may be inappropriate. Efforts were made to edit the dictations. Jimbo Chi MD  04/30/20 4923       Jimbo Chi MD  04/30/20 6589        Patient's daughter is here, she remains stable and asymptomatic, discharged in stable condition.       Jimbo Chi MD  04/30/20 8814

## 2020-05-01 LAB
EKG ATRIAL RATE: 79 BPM
EKG DIAGNOSIS: NORMAL
EKG P AXIS: 56 DEGREES
EKG P-R INTERVAL: 188 MS
EKG Q-T INTERVAL: 426 MS
EKG QRS DURATION: 90 MS
EKG QTC CALCULATION (BAZETT): 488 MS
EKG R AXIS: -25 DEGREES
EKG T AXIS: 83 DEGREES
EKG VENTRICULAR RATE: 79 BPM

## 2020-05-01 PROCEDURE — 93010 ELECTROCARDIOGRAM REPORT: CPT | Performed by: INTERNAL MEDICINE

## 2020-09-24 ENCOUNTER — APPOINTMENT (OUTPATIENT)
Dept: CT IMAGING | Age: 85
End: 2020-09-24
Payer: COMMERCIAL

## 2020-09-24 ENCOUNTER — HOSPITAL ENCOUNTER (EMERGENCY)
Age: 85
Discharge: HOME | End: 2020-09-24
Attending: EMERGENCY MEDICINE
Payer: COMMERCIAL

## 2020-09-24 VITALS
HEART RATE: 85 BPM | OXYGEN SATURATION: 97 % | TEMPERATURE: 98.6 F | WEIGHT: 170 LBS | BODY MASS INDEX: 26.68 KG/M2 | DIASTOLIC BLOOD PRESSURE: 70 MMHG | HEIGHT: 67 IN | RESPIRATION RATE: 16 BRPM | SYSTOLIC BLOOD PRESSURE: 130 MMHG

## 2020-09-24 PROCEDURE — 70450 CT HEAD/BRAIN W/O DYE: CPT

## 2020-09-24 PROCEDURE — 72128 CT CHEST SPINE W/O DYE: CPT

## 2020-09-24 PROCEDURE — 72131 CT LUMBAR SPINE W/O DYE: CPT

## 2020-09-24 PROCEDURE — 72125 CT NECK SPINE W/O DYE: CPT

## 2020-09-24 PROCEDURE — 6370000000 HC RX 637 (ALT 250 FOR IP): Performed by: EMERGENCY MEDICINE

## 2020-09-24 PROCEDURE — 99284 EMERGENCY DEPT VISIT MOD MDM: CPT

## 2020-09-24 RX ORDER — TRAMADOL HYDROCHLORIDE 50 MG/1
50 TABLET ORAL ONCE
Status: COMPLETED | OUTPATIENT
Start: 2020-09-24 | End: 2020-09-24

## 2020-09-24 RX ORDER — DULOXETIN HYDROCHLORIDE 30 MG/1
30 CAPSULE, DELAYED RELEASE ORAL NIGHTLY
COMMUNITY

## 2020-09-24 RX ORDER — IBUPROFEN 600 MG/1
600 TABLET ORAL EVERY 6 HOURS PRN
Qty: 20 TABLET | Refills: 0 | Status: SHIPPED | OUTPATIENT
Start: 2020-09-24 | End: 2020-10-24

## 2020-09-24 RX ORDER — HYDROCODONE BITARTRATE AND ACETAMINOPHEN 5; 325 MG/1; MG/1
1 TABLET ORAL EVERY 8 HOURS PRN
Qty: 15 TABLET | Refills: 0 | Status: SHIPPED | OUTPATIENT
Start: 2020-09-24 | End: 2020-09-29

## 2020-09-24 RX ORDER — IBUPROFEN 800 MG/1
800 TABLET ORAL ONCE
Status: COMPLETED | OUTPATIENT
Start: 2020-09-24 | End: 2020-09-24

## 2020-09-24 RX ORDER — LIDOCAINE 4 G/G
1 PATCH TOPICAL DAILY
Status: DISCONTINUED | OUTPATIENT
Start: 2020-09-24 | End: 2020-09-24 | Stop reason: HOSPADM

## 2020-09-24 RX ADMIN — IBUPROFEN 800 MG: 800 TABLET ORAL at 11:02

## 2020-09-24 RX ADMIN — TRAMADOL HYDROCHLORIDE 50 MG: 50 TABLET, FILM COATED ORAL at 11:02

## 2020-09-24 ASSESSMENT — PAIN SCALES - GENERAL: PAINLEVEL_OUTOF10: 7

## 2020-09-24 ASSESSMENT — PAIN DESCRIPTION - LOCATION: LOCATION: BACK

## 2020-09-24 ASSESSMENT — PAIN DESCRIPTION - ORIENTATION: ORIENTATION: LOWER

## 2020-09-24 NOTE — ED PROVIDER NOTES
on file     Relationship status: Not on file    Intimate partner violence     Fear of current or ex partner: Not on file     Emotionally abused: Not on file     Physically abused: Not on file     Forced sexual activity: Not on file   Other Topics Concern    Not on file   Social History Narrative    Not on file     Current Facility-Administered Medications   Medication Dose Route Frequency Provider Last Rate Last Dose    lidocaine 4 % external patch 1 patch  1 patch Transdermal Daily Penny Marcano MD   1 patch at 09/24/20 1102     Current Outpatient Medications   Medication Sig Dispense Refill    DULoxetine (CYMBALTA) 30 MG extended release capsule Take 30 mg by mouth nightly Unsure of dosage      ibuprofen (IBU) 600 MG tablet Take 1 tablet by mouth every 6 hours as needed for Pain 20 tablet 0    HYDROcodone-acetaminophen (NORCO) 5-325 MG per tablet Take 1 tablet by mouth every 8 hours as needed for Pain for up to 5 days.  15 tablet 0    Cholecalciferol (VITAMIN D3) 2000 units CAPS Take by mouth      ondansetron (ZOFRAN) 4 MG tablet Take 4 mg by mouth as needed for Nausea or Vomiting      calcium carbonate 600 MG TABS tablet Take 1 tablet by mouth daily      Multiple Vitamins-Minerals (THERAPEUTIC MULTIVITAMIN-MINERALS) tablet Take 1 tablet by mouth daily      traZODone (DESYREL) 100 MG tablet Take 100 mg by mouth nightly      Coenzyme Q10 (COQ10) 50 MG CAPS Take by mouth      Probiotic Product (PROBIOTIC DAILY) CAPS Take by mouth      benzonatate (TESSALON) 100 MG capsule Take 100 mg by mouth as needed for Cough      acetaminophen (TYLENOL) 500 MG tablet Take 1,000 mg by mouth as needed for Pain      albuterol sulfate  (90 BASE) MCG/ACT inhaler Inhale 2 puffs into the lungs as needed for Wheezing      midodrine (PROAMATINE) 2.5 MG tablet Take 1 tablet by mouth 3 times daily (with meals) 270 tablet 0    fluvoxaMINE (LUVOX) 50 MG tablet Take 50 mg by mouth 2 times daily       indomethacin (INDOCIN) 25 MG capsule Take 25 mg by mouth 2 times daily (with meals) States takes suppositories for migraines      ascorbic acid (VITAMIN C) 1000 MG tablet Take 1,000 mg by mouth daily.  atorvastatin (LIPITOR) 40 MG tablet Take 40 mg by mouth daily.  Dicyclomine HCl (BENTYL PO) Take 10 mg by mouth as needed       VITAMIN E 800 Units by Does not apply route daily. Allergies   Allergen Reactions    Aspirin     Mom [Magnesium Hydroxide]     Oxycodone Itching     States it just doesn't work. Denies itching    Pcn [Penicillins]     Thorazine [Chlorpromazine Hcl]      Nursing Notes Reviewed    ROS:  At least 10 systems reviewed and otherwise negative except as in the [address] Physical Exam:  ED Triage Vitals   Enc Vitals Group      BP       Pulse       Resp       Temp       Temp src       SpO2       Weight       Height       Head Circumference       Peak Flow       Pain Score       Pain Loc       Pain Edu? Excl. in [address]? My pulse oximetry interpretation is which is within the normal range    GENERAL APPEARANCE: Awake and alert. Cooperative. No acute distress. HEAD: Normocephalic. Atraumatic. EYES: EOM's grossly intact. Sclera anicteric. ENT: Mucous membranes are moist. Tolerates saliva. No trismus. NECK: Supple. No meningismus. Trachea midline. HEART: RRR. Radial pulses 2+. LUNGS: Respirations unlabored. CTAB  ABDOMEN: Soft. Non-tender. No guarding or rebound. BACK[de-identified]   Mild paralumbar muscle tenderness bilaterally and tenderness into lumbar spine. .  No bruising. No lacerations or abrasions. No tenderness to thoracic spine. EXTREMITIES: No acute deformities. There is equal in all extremities. Sensation intact. SKIN: Warm and dry. NEUROLOGICAL: No gross facial drooping. Moves all 4 extremities spontaneously. Able to dorsiflex and plantarflex both feet and great toes. Sensation intact. Normal reflexes in lower legs. PSYCHIATRIC: Normal mood.     I have reviewed and interpreted all of the currently available lab results from this visit (if applicable):  No results found for this visit on 09/24/20. Radiographs:  [] Radiologist's Wet Read Report Reviewed:      CT CERVICAL SPINE WO CONTRAST (Final result)   Result time 09/24/20 11:12:41   Final result by Marleny Mg MD (09/24/20 11:12:41)                 Impression:     CT head:     No evidence for acute intracranial pathology. CT cervical spine:     No acute abnormality of the cervical spine. Narrative:     EXAMINATION:   CT OF THE HEAD WITHOUT CONTRAST; CT OF THE CERVICAL SPINE WITHOUT CONTRAST   9/24/2020 10:41 am; 9/24/2020 10:40 am     TECHNIQUE:   CT of the head was performed without the administration of intravenous   contrast. Dose modulation, iterative reconstruction, and/or weight based   adjustment of the mA/kV was utilized to reduce the radiation dose to as low   as reasonably achievable.; CT of the cervical spine was performed without the   administration of intravenous contrast. Multiplanar reformatted images are   provided for review. Dose modulation, iterative reconstruction, and/or weight   based adjustment of the mA/kV was utilized to reduce the radiation dose to as   low as reasonably achievable. COMPARISON:   CT head 04/30/2020.  CT cervical spine 05/04/2019. HISTORY:   ORDERING SYSTEM PROVIDED HISTORY: fall two days ago, hit head, not on thinners   TECHNOLOGIST PROVIDED HISTORY:   Reason for exam:->fall two days ago, hit head, not on thinners   Has a \"code stroke\" or \"stroke alert\" been called? ->No   Reason for Exam: fall   Acuity: Acute   Type of Exam: Initial   Mechanism of Injury: fall   Relevant Medical/Surgical History: fall; ORDERING SYSTEM PROVIDED HISTORY:   fall two days ago   TECHNOLOGIST PROVIDED HISTORY:   Reason for exam:->fall two days ago   Reason for Exam: fall   Acuity: Acute   Type of Exam: Initial   Mechanism of Injury: fall   Relevant Medical/Surgical History: fall     FINDINGS:   CT HEAD:     BRAIN/VENTRICLES: There is no acute intracranial hemorrhage, mass effect or   midline shift. No abnormal extra-axial fluid collection.  The gray-white   differentiation is maintained without evidence of an acute infarct. There is   prominence of the ventricles and sulci due to global parenchymal volume loss. There are nonspecific areas of hypoattenuation within the periventricular and   subcortical white matter, which likely represent chronic microvascular   ischemic change. ORBITS: The visualized portion of the orbits demonstrate no acute abnormality. SINUSES: The visualized paranasal sinuses and mastoid air cells demonstrate   no acute abnormality. SOFT TISSUES/SKULL: No acute abnormality of the visualized skull or soft   tissues. CT CERVICAL SPINE:     BONES/ALIGNMENT: There is no acute fracture or traumatic malalignment. DEGENERATIVE CHANGES: Stable multilevel degenerative disc disease and facet   osteoarthritis. SOFT TISSUES: There is no prevertebral soft tissue swelling.                       CT HEAD WO CONTRAST (Final result)   Result time 09/24/20 11:12:41   Final result by Cherie Krause MD (09/24/20 11:12:41)                 Impression:     CT head:     No evidence for acute intracranial pathology. CT cervical spine:     No acute abnormality of the cervical spine. Narrative:     EXAMINATION:   CT OF THE HEAD WITHOUT CONTRAST; CT OF THE CERVICAL SPINE WITHOUT CONTRAST   9/24/2020 10:41 am; 9/24/2020 10:40 am     TECHNIQUE:   CT of the head was performed without the administration of intravenous   contrast. Dose modulation, iterative reconstruction, and/or weight based   adjustment of the mA/kV was utilized to reduce the radiation dose to as low   as reasonably achievable.; CT of the cervical spine was performed without the   administration of intravenous contrast. Multiplanar reformatted images are   provided for review. Dose modulation, iterative reconstruction, and/or weight   based adjustment of the mA/kV was utilized to reduce the radiation dose to as   low as reasonably achievable. COMPARISON:   CT head 04/30/2020.  CT cervical spine 05/04/2019. HISTORY:   ORDERING SYSTEM PROVIDED HISTORY: fall two days ago, hit head, not on thinners   TECHNOLOGIST PROVIDED HISTORY:   Reason for exam:->fall two days ago, hit head, not on thinners   Has a \"code stroke\" or \"stroke alert\" been called? ->No   Reason for Exam: fall   Acuity: Acute   Type of Exam: Initial   Mechanism of Injury: fall   Relevant Medical/Surgical History: fall; ORDERING SYSTEM PROVIDED HISTORY:   fall two days ago   TECHNOLOGIST PROVIDED HISTORY:   Reason for exam:->fall two days ago   Reason for Exam: fall   Acuity: Acute   Type of Exam: Initial   Mechanism of Injury: fall   Relevant Medical/Surgical History: fall     FINDINGS:   CT HEAD:     BRAIN/VENTRICLES: There is no acute intracranial hemorrhage, mass effect or   midline shift. No abnormal extra-axial fluid collection.  The gray-white   differentiation is maintained without evidence of an acute infarct. There is   prominence of the ventricles and sulci due to global parenchymal volume loss. There are nonspecific areas of hypoattenuation within the periventricular and   subcortical white matter, which likely represent chronic microvascular   ischemic change. ORBITS: The visualized portion of the orbits demonstrate no acute abnormality. SINUSES: The visualized paranasal sinuses and mastoid air cells demonstrate   no acute abnormality. SOFT TISSUES/SKULL: No acute abnormality of the visualized skull or soft   tissues. CT CERVICAL SPINE:     BONES/ALIGNMENT: There is no acute fracture or traumatic malalignment. DEGENERATIVE CHANGES: Stable multilevel degenerative disc disease and facet   osteoarthritis.      SOFT TISSUES: There is no prevertebral soft tissue swelling.                    CT LUMBAR SPINE WO CONTRAST (Preliminary result)   Result time 09/24/20 12:04:56   Preliminary result by Susy Villalobos MD (09/24/20 12:04:56)                 Impression:     1. Mild superior endplate compression fractures at T2 and T3 with up to 10%   vertebral body height loss. 2. Mild acute superior endplate compression fracture of L1 with 25% vertebral   body height loss.                       CT THORACIC SPINE WO CONTRAST (Preliminary result)   Result time 09/24/20 12:04:56   Preliminary result by Susy Villalobos MD (09/24/20 12:04:56)                 Impression:     1. Mild superior endplate compression fractures at T2 and T3 with up to 10%   vertebral body height loss. 2. Mild acute superior endplate compression fracture of L1 with 25% vertebral   body height loss.                        [] Discussed with Radiologist:     [] The following radiograph was interpreted by myself in the absence of a radiologist:     EKG: (All EKG's are interpreted by myself in the absence of a cardiologist)      MDM:  Vital signs are stable. Did give her a dose of Motrin, Ultram as well as a lidocaine patch. CT head and CT C-spine do not show any acute findings. Thoracic CT shows less than 10% compression fracture of T2 and T3. Lumbar CT shows mild acute superior endplate compression fracture of L1 of approximately 25% height loss. Discussed results with patient and daughter. Daughter states they just went through the same thing with her father. States they did go to [facility] to be fitted for a brace so they do understand the process. Did discuss with them will discharge with Motrin, hydrocodone. Patient has not previously had Vicodin to her knowledge or that of daughters. States she believes she had a little bit of itching with the oxycodone but cannot recall. Did discuss discontinuation of the Norco if she has any abnormal allergic reactive symptoms to this.   She did have Ultram and Motrin here in the

## 2020-09-24 NOTE — ED NOTES
Dispatch instructions and Rx given to pt and daughter. Voices understanding. To private vehicle per WC.       Lena Richard RN  09/24/20 2982

## 2020-11-27 ENCOUNTER — HOSPITAL ENCOUNTER (EMERGENCY)
Age: 85
Discharge: HOME | End: 2020-11-27
Attending: EMERGENCY MEDICINE
Payer: COMMERCIAL

## 2020-11-27 VITALS
HEIGHT: 70 IN | TEMPERATURE: 97.6 F | HEART RATE: 85 BPM | DIASTOLIC BLOOD PRESSURE: 82 MMHG | WEIGHT: 175 LBS | RESPIRATION RATE: 16 BRPM | SYSTOLIC BLOOD PRESSURE: 140 MMHG | OXYGEN SATURATION: 96 % | BODY MASS INDEX: 25.05 KG/M2

## 2020-11-27 PROCEDURE — 10060 I&D ABSCESS SIMPLE/SINGLE: CPT

## 2020-11-27 PROCEDURE — 99283 EMERGENCY DEPT VISIT LOW MDM: CPT

## 2020-11-27 PROCEDURE — 2500000003 HC RX 250 WO HCPCS: Performed by: EMERGENCY MEDICINE

## 2020-11-27 RX ORDER — LIDOCAINE HYDROCHLORIDE 10 MG/ML
5 INJECTION, SOLUTION EPIDURAL; INFILTRATION; INTRACAUDAL; PERINEURAL ONCE
Status: COMPLETED | OUTPATIENT
Start: 2020-11-27 | End: 2020-11-27

## 2020-11-27 RX ADMIN — LIDOCAINE HYDROCHLORIDE 5 ML: 10 INJECTION, SOLUTION EPIDURAL; INFILTRATION; INTRACAUDAL; PERINEURAL at 11:00

## 2020-11-27 ASSESSMENT — PAIN DESCRIPTION - LOCATION: LOCATION: BACK

## 2020-11-27 ASSESSMENT — PAIN SCALES - GENERAL: PAINLEVEL_OUTOF10: 8

## 2020-11-27 ASSESSMENT — PAIN DESCRIPTION - ORIENTATION: ORIENTATION: MID

## 2020-11-27 ASSESSMENT — PAIN DESCRIPTION - PAIN TYPE: TYPE: ACUTE PAIN

## 2020-11-27 NOTE — ED NOTES
Discharge instructions reviewed with patient and daughter. Reviewed medications with patient and daughter. No additional questions asked. Voiced understanding. Encouraged patient and daughter to follow up as discussed by the ED physician.      Trina Dinh RN  11/27/20 1635

## 2020-11-27 NOTE — ED PROVIDER NOTES
Triage Chief Complaint:   Abscess (states abscess on her back. states has had for several years. states just started getting painful 3 weeks ago.  saw her family dr who gave her a 10 day course of Keflex. states got better and then when Keflex was done it got worse.  gave her a 5 day course and set her up with surgeon. states appointment isn't until the end of next week.  is now seeping and getting worse)    Kashia:  Agatha Benton is a 81 y.o. female that presents to the ED with a draining cyst/abscess to the mid back. Is been present for many years. She has been put on 2 courses of Keflex without improvement improvement. First 10 days and 5 days. She is having increasing pain swelling drainage no reported fevers or chills. Patient is scheduled to see a surgeon next week. Past Medical History:   Diagnosis Date    Chest pain     Cystitis     Family history of early CAD 2018    She reports mother  at age of 64 with heart attack. She was not a smoker.  H/O 24 hour EKG monitoring 2019    Normal    H/O cardiovascular stress test 2017    Normal pattern of perfusion in all coronaries, normal LV size and function, EF 54%.  H/O complete electrocardiogram 10/14/2011    H/O Doppler ultrasound 2009    Carotid Doppler. Minimal atherosclerotic plaque in the carotid arterial bifurcation regions bilaterally. No sonographic evidence of hemodynamically significant carotid stenosis is noted. Antegrade flow is seen in the vertebral arteries bilaterally.  H/O echocardiogram 19; 2/3/15    LV function and size normal w/normal LV wall thickness, Grade I diastolic dysfunction, all chamber dimensions WNL, sclerotic but non-stenotic aortic valve, mild aortic regurg, mild mitral, tricuspid and pulmonic insufficiency, RVSP= 38 mmHg consistent w/mild pulmonary HTN, EF 55%.     H/O tilt table evaluation 14    Positive for orthostatic hypotension,negative for neurocardiogenic syncope    History of Holter monitoring 12/4/14    24 hour Holter. Sinus rhythm w/physiological HR variations and low-grade symptomatic ventricular ectopy.  Hyperlipidemia     IBS (irritable bowel syndrome)     Migraine     Obesity     KYLEIGH (obstructive sleep apnea) 9/9/2019    Suspected.  Other activity(E029.9) 03/23/2010    48 Holter Monitor. Normal holter findingd with no clinically significant arrhythmias.  Palpitations     SOBOE (shortness of breath on exertion)     Stroke (HCC)     several TIA's    Syncopal episodes     Tachycardia     av modal re-entry     Past Surgical History:   Procedure Laterality Date    ATRIAL ABLATION SURGERY  1999    BREAST BIOPSY      bilateral    CATARACT REMOVAL      bilateral    HYSTERECTOMY      repair of prolapse    JOINT REPLACEMENT      bilateral knee, bilateral hip    ROTATOR CUFF REPAIR      right     History reviewed. No pertinent family history.   Social History     Socioeconomic History    Marital status:      Spouse name: Not on file    Number of children: Not on file    Years of education: Not on file    Highest education level: Not on file   Occupational History    Not on file   Social Needs    Financial resource strain: Not on file    Food insecurity     Worry: Not on file     Inability: Not on file    Transportation needs     Medical: Not on file     Non-medical: Not on file   Tobacco Use    Smoking status: Never Smoker    Smokeless tobacco: Never Used   Substance and Sexual Activity    Alcohol use: No    Drug use: No    Sexual activity: Yes     Partners: Male     Comment:    Lifestyle    Physical activity     Days per week: Not on file     Minutes per session: Not on file    Stress: Not on file   Relationships    Social connections     Talks on phone: Not on file     Gets together: Not on file     Attends Denominational service: Not on file     Active member of club or organization: Not on file Attends meetings of clubs or organizations: Not on file     Relationship status: Not on file    Intimate partner violence     Fear of current or ex partner: Not on file     Emotionally abused: Not on file     Physically abused: Not on file     Forced sexual activity: Not on file   Other Topics Concern    Not on file   Social History Narrative    Not on file     Current Facility-Administered Medications   Medication Dose Route Frequency Provider Last Rate Last Dose    lidocaine PF 1 % injection 5 mL  5 mL Intradermal Once Fina Aletha, DO         Current Outpatient Medications   Medication Sig Dispense Refill    DULoxetine (CYMBALTA) 30 MG extended release capsule Take 30 mg by mouth nightly Unsure of dosage      ibuprofen (IBU) 600 MG tablet Take 1 tablet by mouth every 6 hours as needed for Pain 20 tablet 0    Cholecalciferol (VITAMIN D3) 2000 units CAPS Take by mouth      ondansetron (ZOFRAN) 4 MG tablet Take 4 mg by mouth as needed for Nausea or Vomiting      calcium carbonate 600 MG TABS tablet Take 1 tablet by mouth daily      Multiple Vitamins-Minerals (THERAPEUTIC MULTIVITAMIN-MINERALS) tablet Take 1 tablet by mouth daily      traZODone (DESYREL) 100 MG tablet Take 100 mg by mouth nightly      Coenzyme Q10 (COQ10) 50 MG CAPS Take by mouth      Probiotic Product (PROBIOTIC DAILY) CAPS Take by mouth      benzonatate (TESSALON) 100 MG capsule Take 100 mg by mouth as needed for Cough      acetaminophen (TYLENOL) 500 MG tablet Take 1,000 mg by mouth as needed for Pain      albuterol sulfate  (90 BASE) MCG/ACT inhaler Inhale 2 puffs into the lungs as needed for Wheezing      midodrine (PROAMATINE) 2.5 MG tablet Take 1 tablet by mouth 3 times daily (with meals) 270 tablet 0    fluvoxaMINE (LUVOX) 50 MG tablet Take 50 mg by mouth 2 times daily       indomethacin (INDOCIN) 25 MG capsule Take 25 mg by mouth 2 times daily (with meals) States takes suppositories for migraines      ascorbic acid (VITAMIN C) 1000 MG tablet Take 1,000 mg by mouth daily.  atorvastatin (LIPITOR) 40 MG tablet Take 40 mg by mouth daily.  Dicyclomine HCl (BENTYL PO) Take 10 mg by mouth as needed       VITAMIN E 800 Units by Does not apply route daily. Allergies   Allergen Reactions    Aspirin     Mom [Magnesium Hydroxide]     Oxycodone Itching     States it just doesn't work. Denies itching    Pcn [Penicillins]     Thorazine [Chlorpromazine Hcl]          ROS:    Review of Systems   Skin: Positive for wound. Large open wound abscess/cyst draining mid lower T-spine region. All other systems reviewed and are negative. Nursing Notes Reviewed    Physical Exam:  ED Triage Vitals   Enc Vitals Group      BP       Pulse       Resp       Temp       Temp src       SpO2       Weight       Height       Head Circumference       Peak Flow       Pain Score       Pain Loc       Pain Edu? Excl. in [address]? Physical Exam  Vitals signs and nursing note reviewed. Exam conducted with a chaperone present. Constitutional:       Appearance: She is well-developed. HENT:      Head: Normocephalic and atraumatic. Eyes:      Pupils: Pupils are equal, round, and reactive to light. Neck:      Musculoskeletal: Normal range of motion and neck supple. Musculoskeletal: Normal range of motion. Comments: Mid back reveals an open wound actively draining there is sebaceous material.  There is hyperpigmented changes around her skin the wound is open. No crepitus. No warmth. No purulent drainage. The patient has   Skin:     General: Skin is warm and dry. Neurological:      Mental Status: She is alert and oriented to person, place, and time. I have reviewed and interpreted all of the currently available lab results from this visit (ifapplicable):  No results found for this visit on 11/27/20.    Radiographs (if obtained):  [] The following radiograph wasinterpreted by myself in the absence of a radiologist:   [] Radiologist's Report Reviewed:  No orders to display         EKG (if obtained): (All EKG's are interpreted by myself in the absence of a cardiologist)    Chart review shows recent radiographs:  No results found. MDM:      PROCEDURE:  INCISION & DRAINAGE  Meenakshi France or their surrogate had an opportunity to ask questions, and the risks, benefits, and alternatives were discussed. The abscess was prepped and draped to maintain a sterile field. A local anesthetic was used to completely anesthetize the abscess. An incision was made to keep the abscess open so it will continue to drain. It was copiously irrigated with its loculations broken down. There were no complications during the procedure. Clinical Impression:  1. Sebaceous cyst      Disposition referral (if applicable):  Brenda Yang, [address] [place] [address] [address] [place] [address]  [phone]    Schedule an appointment as soon as possible for a visit   If symptoms worsen    Disposition medications (if applicable):  New Prescriptions    No medications on file           Gurjit Weaver DO, FACEP      Comment: Please note this report has been produced using speech recognition software and maycontain errors related to that system including errors in grammar, punctuation, and spelling, as well as words and phrases that may be inappropriate. If there are any questions or concerns please feel free to contact thedictating provider for clarification.         Isela Zhang DO  11/27/20 3370

## 2020-11-27 NOTE — ED NOTES
Tolerated procedure without difficulty. DSD applied to wound. Physician discussed wound care. No additional questions asked. Assisted patient to dress. Daughter at bedside.       Silva Boston RN  11/27/20 5025

## 2022-04-21 ENCOUNTER — HOSPITAL ENCOUNTER (OUTPATIENT)
Dept: MAMMOGRAPHY | Age: 87
Discharge: HOME | End: 2022-04-21
Payer: COMMERCIAL

## 2022-04-21 ENCOUNTER — HOSPITAL ENCOUNTER (OUTPATIENT)
Dept: ULTRASOUND IMAGING | Age: 87
Discharge: HOME | End: 2022-04-21
Payer: COMMERCIAL

## 2022-04-21 DIAGNOSIS — R92.8 ABNORMAL MAMMOGRAM: ICD-10-CM

## 2022-04-21 DIAGNOSIS — N64.4 MASTODYNIA: ICD-10-CM

## 2022-04-21 DIAGNOSIS — N60.19 FIBROCYSTIC BREAST DISEASE (FCBD), UNSPECIFIED LATERALITY: ICD-10-CM

## 2022-04-21 DIAGNOSIS — Z13.820 SCREENING FOR OSTEOPOROSIS: ICD-10-CM

## 2022-04-21 PROCEDURE — G0279 TOMOSYNTHESIS, MAMMO: HCPCS

## 2022-04-21 PROCEDURE — 77080 DXA BONE DENSITY AXIAL: CPT

## 2022-04-21 PROCEDURE — 76642 ULTRASOUND BREAST LIMITED: CPT

## 2023-06-12 NOTE — PROGRESS NOTES
Kaci Chua  1935  Thiago Echols MD    Chief complaint and HPI:  Kaci Chua  81year-old female follows up for palpitations and the atypical chest pains in the off-and-on shortness of breath. She continues to have occasional chest pain lasts only for 4-5 minutes nonradiating and not associated with any diaphoresis or nausea vomiting. She had a nuclear stress test last year for elevation of the symptoms which was negative. She hasn't had any syncope recurrence in the last 1 year. She has been compliant with medications and has had regular blood test done by PCP the results of which are not available for us to review today. She walks with a cane for balance it and hasn't a tendency to fall without it. She denies edema and dizziness. She was never a smoker. She has not had recent labs done. She takes Atorvastatin. She is not active. Rest of the Cardiovascular system review is otherwise unchanged from prior encounter. Past medical history:  has a past medical history of Chest pain; Cystitis; Family history of early CAD; H/O cardiovascular stress test; H/O complete electrocardiogram; H/O Doppler ultrasound; H/O echocardiogram; H/O tilt table evaluation; History of Holter monitoring; Hyperlipidemia; IBS (irritable bowel syndrome); Migraine; Obesity; Other activity(E029.9); Palpitations; SOBOE (shortness of breath on exertion); Stroke [facility]); Syncopal episodes; and Tachycardia. Past surgical history:  has a past surgical history that includes Rotator cuff repair; Hysterectomy; Cataract removal; Breast biopsy; Atrial ablation surgery (1999); and joint replacement. Social History:   Social History   Substance Use Topics    Smoking status: Never Smoker    Smokeless tobacco: Never Used    Alcohol use No     Family history: family history is not on file. ALLERGIES:  Aspirin; Mom [magnesium hydroxide];  Oxycodone; Pcn [penicillins]; and Thorazine [chlorpromazine hcl]  Prior to Admission medications    Medication Sig Start Date End Date Taking? Authorizing Provider   magnesium (MAGNESIUM-OXIDE) 250 MG TABS tablet Take 250 mg by mouth daily   Yes Historical Provider, MD   traZODone (DESYREL) 100 MG tablet Take 100 mg by mouth nightly   Yes Historical Provider, MD   hyoscyamine (ANASPAZ;LEVSIN) 125 MCG tablet Take 125 mcg by mouth 4 times daily as needed for Cramping   Yes Historical Provider, MD   Coenzyme Q10 (COQ10) 50 MG CAPS Take by mouth   Yes Historical Provider, MD   Probiotic Product (PROBIOTIC DAILY) CAPS Take by mouth   Yes Historical Provider, MD   oxybutynin (DITROPAN) 5 MG tablet Take 5 mg by mouth daily   Yes Historical Provider, MD   benzonatate (TESSALON) 100 MG capsule Take 100 mg by mouth as needed for Cough   Yes Historical Provider, MD   acetaminophen (TYLENOL) 500 MG tablet Take 1,000 mg by mouth as needed for Pain   Yes Historical Provider, MD   albuterol sulfate  (90 BASE) MCG/ACT inhaler Inhale 2 puffs into the lungs as needed for Wheezing   Yes Historical Provider, MD   midodrine (PROAMATINE) 2.5 MG tablet Take 1 tablet by mouth 3 times daily (with meals) 10/21/16  Yes Alma Leroy MD   fluvoxaMINE (LUVOX) 50 MG tablet Take 50 mg by mouth nightly Pt takes 1/2 in am and 1 at night   Yes Historical Provider, MD   indomethacin (INDOCIN) 25 MG capsule Take 25 mg by mouth 2 times daily (with meals) States takes suppositories for migraines   Yes Historical Provider, MD   metoprolol (LOPRESSOR) 50 MG tablet 0.5 tablets 2 times daily. 1/16/15  Yes Alma Leroy MD   temazepam (RESTORIL) 15 MG capsule Take 15 mg by mouth nightly as needed for Sleep. Yes Historical Provider, MD   PATANOL 0.1 % ophthalmic solution  12/1/14  Yes Historical Provider, MD   fluticasone (FLONASE) 50 MCG/ACT nasal spray  8/14/13  Yes Historical Provider, MD   ascorbic acid (VITAMIN C) 1000 MG tablet Take 1,000 mg by mouth daily.    Yes Historical Provider, MD   atorvastatin (LIPITOR) 40 MG tablet Take 40 mg by mouth daily. Yes Historical Provider, MD   loratadine (CLARITIN) 10 MG tablet Take 10 mg by mouth as needed. Yes Historical Provider, MD   ALPRAZolam (XANAX) 0.5 MG tablet 1 mg 2 times daily    Yes Historical Provider, MD   Dicyclomine HCl (BENTYL PO) Take 10 mg by mouth as needed    Yes Historical Provider, MD   VITAMIN E 800 Units by Does not apply route daily. Yes Historical Provider, MD     Constitutional:  /70 (Site: Left Arm, Position: Sitting, Cuff Size: Large Adult)   Pulse 76   Resp 16   Ht 5' 7\" (1.702 m)   Wt 171 lb (77.6 kg)   BMI 26.78 kg/m²    Body mass index is 26.78 kg/m². Wt Readings from Last 3 Encounters:   02/27/18 171 lb (77.6 kg)   02/07/17 193 lb (87.5 kg)   09/29/16 190 lb (86.2 kg)     General exam: Patient is awake, alert and oriented and in no acute or apparent distress.   Head and Neck: Normocephalic. Neck is supple . Wears glasses   Carotids: no Bruits. No thyromegaly  Jugular venous pressure: Not elevated. Heart[de-identified] Heart sounds are normal. No murmurs or gallop  Peripheral Pulses: 1+ equal  Extremities: No edema  Lungs:Lungs are clear to auscultation and percussion.    Abdomen: Soft non tender. Bowel sounds are normal. No organomegaly or ascites.   Musculoskeletal: WNL   Skin: Normal in color and texture.  No rash   Psychiatric: Normal mood and effect.    Neurologic exam:  No focal deficit    ECG done today shows sinus rhythm poor R-wave progression in anterior leads low QRS voltages and chest leads rate is 70 bpm.    LAB REVIEW:  CBC:   Lab Results   Component Value Date    WBC 6.5 09/29/2016    HGB 15.3 09/29/2016    HCT 45.7 09/29/2016     09/29/2016   Renal:   Lab Results   Component Value Date    BUN 22 09/29/2016    CREATININE 1.0 09/29/2016     09/29/2016    K 3.9 09/29/2016     PT/INR:   Lab Results   Component Value Date    INR 0.89 06/15/2015     IMPRESSION and RECOMMENDATIONS:      Chest pain  Very atypical by description and have not changed in the last 1 year and do not recommend any further workup at this time. Hyperlipidemia  Continue current dose of statin and follow-up with the Dr. Ross Mcgrath. Goal is to keep her LDL below 100. Primary prevention is counseled. Palpitations  Much improved and stable on low-dose Lopressor she is on twice a day. Continue the same. Family history of early CAD  Continue aggressive risk modification for primary prevention. Continue current cardiovascular medications which have been reviewed and discussed individually with you. Primary prevention is the goal by aggressive risk modification, healthy and therapeutic life style changes for cardiovascular risk reduction. Various goals are discussed and questions answered. Appropriate prescriptions if needed on this visit are addressed. After visit summery is provided. Questions answered and patient verbalizes understanding. Follow up with PCP and see me as needed. Marcia Montero MD, 2/27/2018 3:32 PM     Please note this report has been partially produced using speech recognition software and may contain errors related to that system including errors in grammar, punctuation, and spelling, as well as words and phrases that may be inappropriate. If there are any questions or concerns please feel free to contact the dictating provider for clarification. (E4) spontaneous